# Patient Record
Sex: FEMALE | Race: BLACK OR AFRICAN AMERICAN | NOT HISPANIC OR LATINO | URBAN - METROPOLITAN AREA
[De-identification: names, ages, dates, MRNs, and addresses within clinical notes are randomized per-mention and may not be internally consistent; named-entity substitution may affect disease eponyms.]

---

## 2021-08-25 PROCEDURE — 87186 SC STD MICRODIL/AGAR DIL: CPT | Performed by: FAMILY MEDICINE

## 2021-08-25 PROCEDURE — 87086 URINE CULTURE/COLONY COUNT: CPT | Performed by: FAMILY MEDICINE

## 2021-08-25 PROCEDURE — 87077 CULTURE AEROBIC IDENTIFY: CPT | Performed by: FAMILY MEDICINE

## 2021-08-27 ENCOUNTER — TELEPHONE (OUTPATIENT)
Dept: URGENT CARE | Facility: CLINIC | Age: 36
End: 2021-08-27

## 2021-08-27 NOTE — TELEPHONE ENCOUNTER
----- Message from Promise Woods MD sent at 8/27/2021 12:16 PM EDT -----  Please notify patient that her urine culture is growing E. coli sensitive to the antibiotic she is already taking.  She should continue taking her antibiotic until the course is complete.

## 2021-08-28 ENCOUNTER — TELEPHONE (OUTPATIENT)
Dept: URGENT CARE | Facility: CLINIC | Age: 36
End: 2021-08-28

## 2021-08-29 ENCOUNTER — TELEPHONE (OUTPATIENT)
Dept: URGENT CARE | Facility: CLINIC | Age: 36
End: 2021-08-29

## 2021-12-14 ENCOUNTER — APPOINTMENT (OUTPATIENT)
Dept: GENERAL RADIOLOGY | Facility: HOSPITAL | Age: 36
End: 2021-12-14

## 2021-12-14 ENCOUNTER — HOSPITAL ENCOUNTER (EMERGENCY)
Facility: HOSPITAL | Age: 36
Discharge: HOME | End: 2021-12-14
Attending: EMERGENCY MEDICINE | Admitting: EMERGENCY MEDICINE

## 2021-12-14 VITALS
BODY MASS INDEX: 35.99 KG/M2 | HEIGHT: 67 IN | TEMPERATURE: 98.5 F | HEART RATE: 74 BPM | DIASTOLIC BLOOD PRESSURE: 87 MMHG | OXYGEN SATURATION: 100 % | SYSTOLIC BLOOD PRESSURE: 164 MMHG | WEIGHT: 229.28 LBS | RESPIRATION RATE: 18 BRPM

## 2021-12-14 DIAGNOSIS — J04.0 LARYNGITIS, ACUTE: Primary | ICD-10-CM

## 2021-12-14 LAB
ALBUMIN SERPL-MCNC: 3.9 G/DL (ref 3.5–5.2)
ALBUMIN/GLOB SERPL: 1.3 G/DL
ALP SERPL-CCNC: 108 U/L (ref 39–117)
ALT SERPL W P-5'-P-CCNC: 12 U/L (ref 1–33)
ANION GAP SERPL CALCULATED.3IONS-SCNC: 9.6 MMOL/L (ref 5–15)
AST SERPL-CCNC: 14 U/L (ref 1–32)
BASOPHILS # BLD AUTO: 0.03 10*3/MM3 (ref 0–0.2)
BASOPHILS NFR BLD AUTO: 0.4 % (ref 0–1.5)
BILIRUB SERPL-MCNC: 0.4 MG/DL (ref 0–1.2)
BILIRUB UR QL STRIP: NEGATIVE
BUN SERPL-MCNC: 8 MG/DL (ref 6–20)
BUN/CREAT SERPL: 11.3 (ref 7–25)
CALCIUM SPEC-SCNC: 9.1 MG/DL (ref 8.6–10.5)
CHLORIDE SERPL-SCNC: 102 MMOL/L (ref 98–107)
CLARITY UR: CLEAR
CO2 SERPL-SCNC: 28.4 MMOL/L (ref 22–29)
COLOR UR: YELLOW
CREAT SERPL-MCNC: 0.71 MG/DL (ref 0.57–1)
DEPRECATED RDW RBC AUTO: 51.8 FL (ref 37–54)
EOSINOPHIL # BLD AUTO: 0.17 10*3/MM3 (ref 0–0.4)
EOSINOPHIL NFR BLD AUTO: 2.2 % (ref 0.3–6.2)
ERYTHROCYTE [DISTWIDTH] IN BLOOD BY AUTOMATED COUNT: 13.9 % (ref 12.3–15.4)
GFR SERPL CREATININE-BSD FRML MDRD: 112 ML/MIN/1.73
GLOBULIN UR ELPH-MCNC: 3.1 GM/DL
GLUCOSE SERPL-MCNC: 96 MG/DL (ref 65–99)
GLUCOSE UR STRIP-MCNC: NEGATIVE MG/DL
HCG INTACT+B SERPL-ACNC: 1.18 MIU/ML
HCT VFR BLD AUTO: 39.5 % (ref 34–46.6)
HGB BLD-MCNC: 13.9 G/DL (ref 12–15.9)
HGB UR QL STRIP.AUTO: NEGATIVE
HOLD SPECIMEN: NORMAL
HOLD SPECIMEN: NORMAL
IMM GRANULOCYTES # BLD AUTO: 0.02 10*3/MM3 (ref 0–0.05)
IMM GRANULOCYTES NFR BLD AUTO: 0.3 % (ref 0–0.5)
KETONES UR QL STRIP: NEGATIVE
LEUKOCYTE ESTERASE UR QL STRIP.AUTO: NEGATIVE
LIPASE SERPL-CCNC: 23 U/L (ref 13–60)
LYMPHOCYTES # BLD AUTO: 2.76 10*3/MM3 (ref 0.7–3.1)
LYMPHOCYTES NFR BLD AUTO: 36.2 % (ref 19.6–45.3)
MCH RBC QN AUTO: 35.9 PG (ref 26.6–33)
MCHC RBC AUTO-ENTMCNC: 35.2 G/DL (ref 31.5–35.7)
MCV RBC AUTO: 102.1 FL (ref 79–97)
MONOCYTES # BLD AUTO: 0.57 10*3/MM3 (ref 0.1–0.9)
MONOCYTES NFR BLD AUTO: 7.5 % (ref 5–12)
NEUTROPHILS NFR BLD AUTO: 4.07 10*3/MM3 (ref 1.7–7)
NEUTROPHILS NFR BLD AUTO: 53.4 % (ref 42.7–76)
NITRITE UR QL STRIP: NEGATIVE
NRBC BLD AUTO-RTO: 0 /100 WBC (ref 0–0.2)
PH UR STRIP.AUTO: 8 [PH] (ref 5–8)
PLATELET # BLD AUTO: 358 10*3/MM3 (ref 140–450)
PMV BLD AUTO: 8.9 FL (ref 6–12)
POTASSIUM SERPL-SCNC: 3.4 MMOL/L (ref 3.5–5.2)
PROT SERPL-MCNC: 7 G/DL (ref 6–8.5)
PROT UR QL STRIP: NEGATIVE
RBC # BLD AUTO: 3.87 10*6/MM3 (ref 3.77–5.28)
SODIUM SERPL-SCNC: 140 MMOL/L (ref 136–145)
SP GR UR STRIP: 1.01 (ref 1–1.03)
UROBILINOGEN UR QL STRIP: ABNORMAL
WBC NRBC COR # BLD: 7.62 10*3/MM3 (ref 3.4–10.8)
WHOLE BLOOD HOLD SPECIMEN: NORMAL
WHOLE BLOOD HOLD SPECIMEN: NORMAL

## 2021-12-14 PROCEDURE — 36415 COLL VENOUS BLD VENIPUNCTURE: CPT

## 2021-12-14 PROCEDURE — 94640 AIRWAY INHALATION TREATMENT: CPT

## 2021-12-14 PROCEDURE — 99283 EMERGENCY DEPT VISIT LOW MDM: CPT

## 2021-12-14 PROCEDURE — 80053 COMPREHEN METABOLIC PANEL: CPT

## 2021-12-14 PROCEDURE — 85025 COMPLETE CBC W/AUTO DIFF WBC: CPT

## 2021-12-14 PROCEDURE — 96375 TX/PRO/DX INJ NEW DRUG ADDON: CPT

## 2021-12-14 PROCEDURE — 25010000002 KETOROLAC TROMETHAMINE PER 15 MG: Performed by: PHYSICIAN ASSISTANT

## 2021-12-14 PROCEDURE — 25010000002 ONDANSETRON PER 1 MG: Performed by: PHYSICIAN ASSISTANT

## 2021-12-14 PROCEDURE — 83690 ASSAY OF LIPASE: CPT

## 2021-12-14 PROCEDURE — 94799 UNLISTED PULMONARY SVC/PX: CPT

## 2021-12-14 PROCEDURE — 84702 CHORIONIC GONADOTROPIN TEST: CPT

## 2021-12-14 PROCEDURE — 96374 THER/PROPH/DIAG INJ IV PUSH: CPT

## 2021-12-14 PROCEDURE — 71045 X-RAY EXAM CHEST 1 VIEW: CPT

## 2021-12-14 PROCEDURE — 81003 URINALYSIS AUTO W/O SCOPE: CPT

## 2021-12-14 RX ORDER — ONDANSETRON 2 MG/ML
4 INJECTION INTRAMUSCULAR; INTRAVENOUS ONCE
Status: COMPLETED | OUTPATIENT
Start: 2021-12-14 | End: 2021-12-14

## 2021-12-14 RX ORDER — GUAIFENESIN DEXTROMETHORPHAN HYDROBROMIDE ORAL SOLUTION 10; 100 MG/5ML; MG/5ML
5 SOLUTION ORAL EVERY 12 HOURS
Qty: 100 ML | Refills: 0 | Status: SHIPPED | OUTPATIENT
Start: 2021-12-14

## 2021-12-14 RX ORDER — SODIUM CHLORIDE 0.9 % (FLUSH) 0.9 %
10 SYRINGE (ML) INJECTION AS NEEDED
Status: DISCONTINUED | OUTPATIENT
Start: 2021-12-14 | End: 2021-12-14 | Stop reason: HOSPADM

## 2021-12-14 RX ORDER — IPRATROPIUM BROMIDE AND ALBUTEROL SULFATE 2.5; .5 MG/3ML; MG/3ML
3 SOLUTION RESPIRATORY (INHALATION) ONCE
Status: COMPLETED | OUTPATIENT
Start: 2021-12-14 | End: 2021-12-14

## 2021-12-14 RX ORDER — ONDANSETRON 4 MG/1
4 TABLET, ORALLY DISINTEGRATING ORAL 4 TIMES DAILY PRN
Qty: 12 TABLET | Refills: 0 | Status: SHIPPED | OUTPATIENT
Start: 2021-12-14 | End: 2022-01-18

## 2021-12-14 RX ORDER — ALBUTEROL SULFATE 90 UG/1
2 AEROSOL, METERED RESPIRATORY (INHALATION) EVERY 4 HOURS PRN
Qty: 1 G | Refills: 0 | Status: SHIPPED | OUTPATIENT
Start: 2021-12-14

## 2021-12-14 RX ORDER — PREDNISONE 20 MG/1
TABLET ORAL
Qty: 8 TABLET | Refills: 0 | Status: SHIPPED | OUTPATIENT
Start: 2021-12-14 | End: 2021-12-24

## 2021-12-14 RX ORDER — KETOROLAC TROMETHAMINE 30 MG/ML
30 INJECTION, SOLUTION INTRAMUSCULAR; INTRAVENOUS ONCE
Status: COMPLETED | OUTPATIENT
Start: 2021-12-14 | End: 2021-12-14

## 2021-12-14 RX ADMIN — ONDANSETRON 4 MG: 2 INJECTION INTRAMUSCULAR; INTRAVENOUS at 17:50

## 2021-12-14 RX ADMIN — IPRATROPIUM BROMIDE AND ALBUTEROL SULFATE 3 ML: .5; 3 SOLUTION RESPIRATORY (INHALATION) at 18:43

## 2021-12-14 RX ADMIN — SODIUM CHLORIDE 1000 ML: 9 INJECTION, SOLUTION INTRAVENOUS at 17:49

## 2021-12-14 RX ADMIN — KETOROLAC TROMETHAMINE 30 MG: 30 INJECTION, SOLUTION INTRAMUSCULAR; INTRAVENOUS at 17:50

## 2021-12-15 NOTE — ED PROVIDER NOTES
Subjective   36-year-old female presents to the emergency department with complaints of sore throat, generalized body aches, and fatigue x3 weeks.  Patient states she was seen in urgent care today and had Covid, strep, flu rand which all came back negative.  They sent her here for further evaluation management of symptoms.  Patient denies any fevers or chills, shortness of breath or difficulty breathing, chest pain, abdominal pain, bowel or bladder complaints.  Resting comfortably, no acute distress.  Blood pressure initially elevated, denies previous history of hypertension, will repeat after ministration medications and fluids.      History provided by:  Patient   used: No        Review of Systems   Constitutional: Negative for chills and fever.   HENT: Positive for congestion, sore throat and voice change. Negative for ear pain, sinus pressure, sinus pain and trouble swallowing.    Eyes: Negative for pain.   Respiratory: Positive for cough. Negative for chest tightness and shortness of breath.    Cardiovascular: Negative for chest pain.   Gastrointestinal: Negative for abdominal pain, diarrhea, nausea and vomiting.   Genitourinary: Negative for flank pain and hematuria.   Musculoskeletal: Negative for joint swelling.   Skin: Negative for pallor.   Neurological: Negative for seizures and headaches.   All other systems reviewed and are negative.      Past Medical History:   Diagnosis Date   • Anxiety and depression        No Known Allergies    History reviewed. No pertinent surgical history.    History reviewed. No pertinent family history.    Social History     Socioeconomic History   • Marital status: Single   Tobacco Use   • Smoking status: Current Every Day Smoker     Packs/day: 0.50     Types: Cigarettes   • Smokeless tobacco: Never Used   Vaping Use   • Vaping Use: Never used   Substance and Sexual Activity   • Alcohol use: Yes     Comment: OCC           Objective   Physical Exam  Vitals  and nursing note reviewed.   Constitutional:       General: She is not in acute distress.     Appearance: Normal appearance. She is not toxic-appearing.   HENT:      Head: Normocephalic and atraumatic.      Nose: Congestion and rhinorrhea present.      Mouth/Throat:      Mouth: Mucous membranes are moist.      Pharynx: Uvula midline. Posterior oropharyngeal erythema present. No oropharyngeal exudate or uvula swelling.      Tonsils: No tonsillar exudate or tonsillar abscesses.   Eyes:      General: No scleral icterus.     Conjunctiva/sclera: Conjunctivae normal.      Pupils: Pupils are equal, round, and reactive to light.   Cardiovascular:      Rate and Rhythm: Normal rate and regular rhythm.      Pulses: Normal pulses.      Heart sounds: Normal heart sounds.   Pulmonary:      Effort: Pulmonary effort is normal. No respiratory distress.      Breath sounds: Normal breath sounds.   Abdominal:      General: Abdomen is flat.      Palpations: Abdomen is soft.      Tenderness: There is no abdominal tenderness.   Musculoskeletal:         General: Normal range of motion.      Cervical back: Normal range of motion and neck supple.   Skin:     General: Skin is warm and dry.      Capillary Refill: Capillary refill takes less than 2 seconds.   Neurological:      General: No focal deficit present.      Mental Status: She is alert and oriented to person, place, and time. Mental status is at baseline.         Procedures           ED Course  ED Course as of 12/14/21 1933   Tue Dec 14, 2021   1928 BP: 168/93  FU with PCP regarding elevated BP.  [KF]      ED Course User Index  [KF] Marleen Espinal, MOON                                                 MDM  Number of Diagnoses or Management Options  Diagnosis management comments: The patient is well-appearing and in no respiratory distress.  Patient tested negative for Covid, flu, strep at a prior facility before arrival.  The patient has a normal mental status and is neurologically  intact. The patient appears well and is able to tolerate food for fluid by mouth and there's no significant dehydration. There is no respiratory distress and no signs of systemic toxicity. The history, exam, diagnostic testing and current condition do not demonstrate an infectious process such as meningitis, retropharyngeal abscess, epiglottitis, sepsis or other serious bacterial infection requiring further testing, treatment, consultation, or admission at this time. The patient has no additional oxygen requirement. The patient has a chest x-ray revealed no acute abnormalities.  Based on physical exam and history it was determined the patient is most likely experiencing laryngitis which can be relieved with warm humidified air, voice rest, anti-inflammatories and other medications for symptomatic relief. The patient has no respiratory distress, or hypoxia.  The vital signs have been stable. The patient's condition is stable and appropriate for discharge. The possible party was advised to return for worsening fever, respiratory distress, intractable vomiting, weakness, shortness of breath, chest pain, or altered mental status. The responsible party will pursue further outpatient evaluation with the primary care physician. The possible party has expressed a clear and thorough understanding and agreed to follow-up as instructed.       Amount and/or Complexity of Data Reviewed  Clinical lab tests: reviewed and ordered  Tests in the radiology section of CPT®: reviewed and ordered    Risk of Complications, Morbidity, and/or Mortality  Presenting problems: low  Diagnostic procedures: low  Management options: low    Patient Progress  Patient progress: stable      Final diagnoses:   Laryngitis, acute       ED Disposition  ED Disposition     ED Disposition Condition Comment    Discharge Stable           No follow-up provider specified.       Medication List      New Prescriptions    albuterol sulfate  (90 Base) MCG/ACT  inhaler  Commonly known as: PROVENTIL HFA;VENTOLIN HFA;PROAIR HFA  Inhale 2 puffs Every 4 (Four) Hours As Needed for Wheezing.     dextromethorphan-guaifenesin  MG/5ML liquid  Commonly known as: ROBITUSSIN-DM  Take 5 mL by mouth Every 12 (Twelve) Hours.     ondansetron ODT 4 MG disintegrating tablet  Commonly known as: ZOFRAN-ODT  Place 1 tablet under the tongue 4 (Four) Times a Day As Needed for Nausea or Vomiting.     predniSONE 20 MG tablet  Commonly known as: DELTASONE  Take 1 tablet by mouth Daily for 5 days, THEN 0.5 tablets Daily for 5 days.  Start taking on: December 14, 2021           Where to Get Your Medications      These medications were sent to [facility] - [place], [place] - [address] - [phone]  - [phone] FX  [address] [facility] [address]    Phone: [phone]   · albuterol sulfate  (90 Base) MCG/ACT inhaler  · dextromethorphan-guaifenesin  MG/5ML liquid  · ondansetron ODT 4 MG disintegrating tablet  · predniSONE 20 MG tablet          Marleen Espinal, PAMichaelC  12/14/21 1933

## 2021-12-15 NOTE — DISCHARGE INSTRUCTIONS
Patient should use albuterol inhaler every 4 hours as needed for shortness of breath and wheezing.    Patient should use Robitussin-DM every 12 hours as needed for cough, congestion, and sore throat.    Patient take Zofran up to four times daily as needed for nausea and vomiting.    Patient should take prednisone 20 mg 5 days, followed by 10 mg 5 days.    Alternate Tylenol and Motrin for symptomatic relief of pain.    Most important treatment is voice rest, humidified air, warm fluid intake.  Follow-up with primary care provider regarding elevated blood pressure found here in the emergency department.  Diagnosis for central hypertension involves two separate readings on two separate occasions, first-line treatment includes calcium channel blockers.

## 2022-01-18 ENCOUNTER — APPOINTMENT (OUTPATIENT)
Dept: ULTRASOUND IMAGING | Facility: HOSPITAL | Age: 37
End: 2022-01-18

## 2022-01-18 ENCOUNTER — HOSPITAL ENCOUNTER (EMERGENCY)
Facility: HOSPITAL | Age: 37
Discharge: HOME | End: 2022-01-18
Attending: EMERGENCY MEDICINE | Admitting: EMERGENCY MEDICINE

## 2022-01-18 VITALS
SYSTOLIC BLOOD PRESSURE: 116 MMHG | TEMPERATURE: 98.2 F | HEIGHT: 67 IN | DIASTOLIC BLOOD PRESSURE: 73 MMHG | HEART RATE: 81 BPM | BODY MASS INDEX: 34.5 KG/M2 | RESPIRATION RATE: 18 BRPM | WEIGHT: 219.8 LBS | OXYGEN SATURATION: 100 %

## 2022-01-18 DIAGNOSIS — J06.9 VIRAL UPPER RESPIRATORY TRACT INFECTION: ICD-10-CM

## 2022-01-18 DIAGNOSIS — N93.9 ABNORMAL VAGINAL BLEEDING: Primary | ICD-10-CM

## 2022-01-18 DIAGNOSIS — Z20.822 COVID-19 VIRUS TEST RESULT UNKNOWN: ICD-10-CM

## 2022-01-18 LAB
ANION GAP SERPL CALCULATED.3IONS-SCNC: 7.9 MMOL/L (ref 5–15)
BACTERIA UR QL AUTO: ABNORMAL /HPF
BASOPHILS # BLD AUTO: 0.02 10*3/MM3 (ref 0–0.2)
BASOPHILS NFR BLD AUTO: 0.4 % (ref 0–1.5)
BILIRUB UR QL STRIP: NEGATIVE
BUN SERPL-MCNC: 7 MG/DL (ref 6–20)
BUN/CREAT SERPL: 10.9 (ref 7–25)
CALCIUM SPEC-SCNC: 9 MG/DL (ref 8.6–10.5)
CHLORIDE SERPL-SCNC: 108 MMOL/L (ref 98–107)
CLARITY UR: CLEAR
CO2 SERPL-SCNC: 24.1 MMOL/L (ref 22–29)
COLOR UR: YELLOW
CREAT SERPL-MCNC: 0.64 MG/DL (ref 0.57–1)
DEPRECATED RDW RBC AUTO: 50.1 FL (ref 37–54)
EOSINOPHIL # BLD AUTO: 0.07 10*3/MM3 (ref 0–0.4)
EOSINOPHIL NFR BLD AUTO: 1.4 % (ref 0.3–6.2)
ERYTHROCYTE [DISTWIDTH] IN BLOOD BY AUTOMATED COUNT: 13.5 % (ref 12.3–15.4)
FLUAV AG NPH QL: NEGATIVE
FLUBV AG NPH QL IA: NEGATIVE
GFR SERPL CREATININE-BSD FRML MDRD: 127 ML/MIN/1.73
GLUCOSE SERPL-MCNC: 95 MG/DL (ref 65–99)
GLUCOSE UR STRIP-MCNC: NEGATIVE MG/DL
HCG SERPL QL: NEGATIVE
HCT VFR BLD AUTO: 36.9 % (ref 34–46.6)
HGB BLD-MCNC: 12.7 G/DL (ref 12–15.9)
HGB UR QL STRIP.AUTO: ABNORMAL
HOLD SPECIMEN: NORMAL
HOLD SPECIMEN: NORMAL
HYALINE CASTS UR QL AUTO: ABNORMAL /LPF
IMM GRANULOCYTES # BLD AUTO: 0.01 10*3/MM3 (ref 0–0.05)
IMM GRANULOCYTES NFR BLD AUTO: 0.2 % (ref 0–0.5)
KETONES UR QL STRIP: NEGATIVE
LEUKOCYTE ESTERASE UR QL STRIP.AUTO: ABNORMAL
LIPASE SERPL-CCNC: 21 U/L (ref 13–60)
LYMPHOCYTES # BLD AUTO: 1.71 10*3/MM3 (ref 0.7–3.1)
LYMPHOCYTES NFR BLD AUTO: 33.3 % (ref 19.6–45.3)
MCH RBC QN AUTO: 34.8 PG (ref 26.6–33)
MCHC RBC AUTO-ENTMCNC: 34.4 G/DL (ref 31.5–35.7)
MCV RBC AUTO: 101.1 FL (ref 79–97)
MONOCYTES # BLD AUTO: 0.44 10*3/MM3 (ref 0.1–0.9)
MONOCYTES NFR BLD AUTO: 8.6 % (ref 5–12)
NEUTROPHILS NFR BLD AUTO: 2.88 10*3/MM3 (ref 1.7–7)
NEUTROPHILS NFR BLD AUTO: 56.1 % (ref 42.7–76)
NITRITE UR QL STRIP: NEGATIVE
NRBC BLD AUTO-RTO: 0 /100 WBC (ref 0–0.2)
PH UR STRIP.AUTO: 6 [PH] (ref 5–8)
PLATELET # BLD AUTO: 327 10*3/MM3 (ref 140–450)
PMV BLD AUTO: 11 FL (ref 6–12)
POTASSIUM SERPL-SCNC: 3.5 MMOL/L (ref 3.5–5.2)
PROT UR QL STRIP: NEGATIVE
RBC # BLD AUTO: 3.65 10*6/MM3 (ref 3.77–5.28)
RBC # UR STRIP: ABNORMAL /HPF
REF LAB TEST METHOD: ABNORMAL
S PYO AG THROAT QL: NEGATIVE
SODIUM SERPL-SCNC: 140 MMOL/L (ref 136–145)
SP GR UR STRIP: 1.01 (ref 1–1.03)
SQUAMOUS #/AREA URNS HPF: ABNORMAL /HPF
UROBILINOGEN UR QL STRIP: ABNORMAL
WBC # UR STRIP: ABNORMAL /HPF
WBC NRBC COR # BLD: 5.13 10*3/MM3 (ref 3.4–10.8)
WHOLE BLOOD HOLD SPECIMEN: NORMAL
WHOLE BLOOD HOLD SPECIMEN: NORMAL

## 2022-01-18 PROCEDURE — 25010000002 KETOROLAC TROMETHAMINE PER 15 MG: Performed by: NURSE PRACTITIONER

## 2022-01-18 PROCEDURE — 87081 CULTURE SCREEN ONLY: CPT | Performed by: NURSE PRACTITIONER

## 2022-01-18 PROCEDURE — 96375 TX/PRO/DX INJ NEW DRUG ADDON: CPT

## 2022-01-18 PROCEDURE — 84703 CHORIONIC GONADOTROPIN ASSAY: CPT | Performed by: NURSE PRACTITIONER

## 2022-01-18 PROCEDURE — 87880 STREP A ASSAY W/OPTIC: CPT | Performed by: NURSE PRACTITIONER

## 2022-01-18 PROCEDURE — 25010000002 ONDANSETRON PER 1 MG: Performed by: NURSE PRACTITIONER

## 2022-01-18 PROCEDURE — U0004 COV-19 TEST NON-CDC HGH THRU: HCPCS | Performed by: NURSE PRACTITIONER

## 2022-01-18 PROCEDURE — 76830 TRANSVAGINAL US NON-OB: CPT

## 2022-01-18 PROCEDURE — 80048 BASIC METABOLIC PNL TOTAL CA: CPT | Performed by: NURSE PRACTITIONER

## 2022-01-18 PROCEDURE — 99284 EMERGENCY DEPT VISIT MOD MDM: CPT

## 2022-01-18 PROCEDURE — 87804 INFLUENZA ASSAY W/OPTIC: CPT | Performed by: NURSE PRACTITIONER

## 2022-01-18 PROCEDURE — 83690 ASSAY OF LIPASE: CPT | Performed by: NURSE PRACTITIONER

## 2022-01-18 PROCEDURE — 85025 COMPLETE CBC W/AUTO DIFF WBC: CPT

## 2022-01-18 PROCEDURE — 81001 URINALYSIS AUTO W/SCOPE: CPT | Performed by: NURSE PRACTITIONER

## 2022-01-18 PROCEDURE — 36415 COLL VENOUS BLD VENIPUNCTURE: CPT

## 2022-01-18 PROCEDURE — 96374 THER/PROPH/DIAG INJ IV PUSH: CPT

## 2022-01-18 RX ORDER — KETOROLAC TROMETHAMINE 30 MG/ML
30 INJECTION, SOLUTION INTRAMUSCULAR; INTRAVENOUS ONCE
Status: COMPLETED | OUTPATIENT
Start: 2022-01-18 | End: 2022-01-18

## 2022-01-18 RX ORDER — SODIUM CHLORIDE 0.9 % (FLUSH) 0.9 %
10 SYRINGE (ML) INJECTION AS NEEDED
Status: DISCONTINUED | OUTPATIENT
Start: 2022-01-18 | End: 2022-01-19 | Stop reason: HOSPADM

## 2022-01-18 RX ORDER — KETOROLAC TROMETHAMINE 10 MG/1
10 TABLET, FILM COATED ORAL EVERY 6 HOURS PRN
Qty: 15 TABLET | Refills: 0 | Status: SHIPPED | OUTPATIENT
Start: 2022-01-18

## 2022-01-18 RX ORDER — ONDANSETRON 4 MG/1
4 TABLET, ORALLY DISINTEGRATING ORAL 4 TIMES DAILY PRN
Qty: 15 TABLET | Refills: 0 | Status: SHIPPED | OUTPATIENT
Start: 2022-01-18

## 2022-01-18 RX ORDER — ONDANSETRON 2 MG/ML
4 INJECTION INTRAMUSCULAR; INTRAVENOUS ONCE
Status: COMPLETED | OUTPATIENT
Start: 2022-01-18 | End: 2022-01-18

## 2022-01-18 RX ADMIN — KETOROLAC TROMETHAMINE 30 MG: 30 INJECTION, SOLUTION INTRAMUSCULAR; INTRAVENOUS at 21:53

## 2022-01-18 RX ADMIN — ONDANSETRON 4 MG: 2 INJECTION INTRAMUSCULAR; INTRAVENOUS at 21:53

## 2022-01-18 RX ADMIN — SODIUM CHLORIDE 500 ML: 9 INJECTION, SOLUTION INTRAVENOUS at 21:53

## 2022-01-19 LAB — SARS-COV-2 RNA PNL SPEC NAA+PROBE: NOT DETECTED

## 2022-01-19 NOTE — DISCHARGE INSTRUCTIONS
Rest, drink plenty of fluids.  Take your meds as prescribed.  You may also take over-the-counter acetaminophen as needed for aches pains and fever.  Call and follow-up with your primary healthcare provider in 2 to 3 days for further evaluation and treatment and to discuss a referral over to a GYN if your symptoms persist or become more worrisome.  You were tested for COVID-19 in the emergency department.  You will need to check your electronic health records within 6 to 12 hours for those test results.  According to the CDC guidelines you will need to continue to quarantine until such time you have a negative test result or your symptoms have resolved.  Return to the emergency department for any acutely developing respiratory distress, any persistent vomiting, or any new or worse concerns.

## 2022-01-19 NOTE — ED PROVIDER NOTES
Subjective   The patient presents to the emergency department and states that her normal menstrual cycle ended at the end of December.  She states that on January 7 she started having a brown discharge and since then has been having vaginal bleeding.  She states that today she passed a large clot and that this concerned her.  She is also complained of lower pelvic pain that she states started on the seventh also.  She states that it goes through to her back.  She states that she has had nausea and vomiting since then and also upper respiratory symptoms of cough and congestion.  She states that she has not had any fevers.  She denies having her COVID-vaccine but states that she does not have COVID because she does not go anywhere.  Patient denies any significant urinary symptoms.  She reports that she is had no diarrhea.  She denies any blood or mucus in her stools.  She does have tenderness in her lower pelvic region with palpation with no rebound or guarding.          Review of Systems   Constitutional: Positive for fatigue. Negative for chills and fever.   HENT: Positive for congestion, rhinorrhea and sore throat. Negative for ear pain, trouble swallowing and voice change.    Eyes: Negative for pain.   Respiratory: Positive for cough. Negative for chest tightness, shortness of breath and wheezing.    Cardiovascular: Negative for chest pain and leg swelling.   Gastrointestinal: Positive for nausea and vomiting. Negative for abdominal pain, constipation and diarrhea.   Genitourinary: Positive for pelvic pain and vaginal discharge. Negative for dysuria, flank pain, frequency, hematuria, urgency and vaginal bleeding.   Musculoskeletal: Positive for myalgias. Negative for back pain, joint swelling, neck pain and neck stiffness.   Skin: Negative for pallor and rash.   Neurological: Negative for seizures and headaches.   All other systems reviewed and are negative.      Past Medical History:   Diagnosis Date   • Anxiety  and depression        No Known Allergies    History reviewed. No pertinent surgical history.    History reviewed. No pertinent family history.    Social History     Socioeconomic History   • Marital status: Single   Tobacco Use   • Smoking status: Current Every Day Smoker     Packs/day: 0.50     Types: Cigarettes   • Smokeless tobacco: Never Used   Vaping Use   • Vaping Use: Never used   Substance and Sexual Activity   • Alcohol use: Yes     Comment: OCC           Objective   Physical Exam  Vitals and nursing note reviewed.   Constitutional:       General: She is not in acute distress.     Appearance: Normal appearance. She is not toxic-appearing.   HENT:      Head: Normocephalic and atraumatic.   Eyes:      General: No scleral icterus.  Cardiovascular:      Rate and Rhythm: Normal rate and regular rhythm.      Pulses: Normal pulses.   Pulmonary:      Effort: Pulmonary effort is normal. No respiratory distress.      Breath sounds: Normal breath sounds. No wheezing.   Abdominal:      General: Abdomen is flat.      Palpations: Abdomen is soft.      Tenderness: There is abdominal tenderness. There is no guarding or rebound.   Musculoskeletal:         General: No swelling or tenderness. Normal range of motion.      Cervical back: Normal range of motion and neck supple. No rigidity or tenderness.   Lymphadenopathy:      Cervical: No cervical adenopathy.   Skin:     General: Skin is warm and dry.      Capillary Refill: Capillary refill takes less than 2 seconds.      Findings: No rash.   Neurological:      General: No focal deficit present.      Mental Status: She is alert and oriented to person, place, and time. Mental status is at baseline.   Psychiatric:         Mood and Affect: Mood normal.         Behavior: Behavior normal.         Procedures           ED Course                                                 MDM  Number of Diagnoses or Management Options  Abnormal vaginal bleeding: minor  COVID-19 virus test result  unknown: minor  Viral upper respiratory tract infection: minor     Amount and/or Complexity of Data Reviewed  Clinical lab tests: reviewed  Tests in the radiology section of CPT®: reviewed  Decide to obtain previous medical records or to obtain history from someone other than the patient: yes    Risk of Complications, Morbidity, and/or Mortality  Presenting problems: minimal  Diagnostic procedures: minimal  Management options: minimal    Patient Progress  Patient progress: stable      Final diagnoses:   Abnormal vaginal bleeding   COVID-19 virus test result unknown   Viral upper respiratory tract infection       ED Disposition  ED Disposition     ED Disposition Condition Comment    Discharge Stable           Sonny Plasencia MD  [address] [place] [address]  [phone]    Call   FOR FOLLOW UP         Medication List      New Prescriptions    ketorolac 10 MG tablet  Commonly known as: TORADOL  Take 1 tablet by mouth Every 6 (Six) Hours As Needed for Moderate Pain .     ondansetron ODT 4 MG disintegrating tablet  Commonly known as: ZOFRAN-ODT  Place 1 tablet on the tongue 4 (Four) Times a Day As Needed for Nausea or Vomiting.           Where to Get Your Medications      These medications were sent to [facility] - [place], [place] - [address] - [phone]  - [phone] FX  [address] [facility] [address]    Phone: [phone]   · ketorolac 10 MG tablet  · ondansetron ODT 4 MG disintegrating tablet          Rachel Hudson, APRN  01/18/22 [id number]

## 2022-01-20 LAB — BACTERIA SPEC AEROBE CULT: NORMAL

## 2023-01-13 ENCOUNTER — HOSPITAL ENCOUNTER (EMERGENCY)
Facility: HOSPITAL | Age: 38
Discharge: HOME | End: 2023-01-13
Attending: EMERGENCY MEDICINE | Admitting: EMERGENCY MEDICINE
Payer: MEDICAID

## 2023-01-13 ENCOUNTER — APPOINTMENT (OUTPATIENT)
Dept: GENERAL RADIOLOGY | Facility: HOSPITAL | Age: 38
End: 2023-01-13
Payer: MEDICAID

## 2023-01-13 VITALS
OXYGEN SATURATION: 100 % | TEMPERATURE: 98.4 F | RESPIRATION RATE: 20 BRPM | SYSTOLIC BLOOD PRESSURE: 157 MMHG | HEART RATE: 92 BPM | DIASTOLIC BLOOD PRESSURE: 102 MMHG | WEIGHT: 216.05 LBS | BODY MASS INDEX: 32.74 KG/M2 | HEIGHT: 68 IN

## 2023-01-13 DIAGNOSIS — J18.9 PNEUMONIA OF LEFT LUNG DUE TO INFECTIOUS ORGANISM, UNSPECIFIED PART OF LUNG: Primary | ICD-10-CM

## 2023-01-13 DIAGNOSIS — R91.1 PULMONARY NODULE: ICD-10-CM

## 2023-01-13 LAB
FLUAV AG NPH QL: NEGATIVE
FLUBV AG NPH QL IA: NEGATIVE
S PYO AG THROAT QL: NEGATIVE
SARS-COV-2 RNA PNL SPEC NAA+PROBE: NOT DETECTED

## 2023-01-13 PROCEDURE — 96374 THER/PROPH/DIAG INJ IV PUSH: CPT

## 2023-01-13 PROCEDURE — 87081 CULTURE SCREEN ONLY: CPT | Performed by: EMERGENCY MEDICINE

## 2023-01-13 PROCEDURE — U0004 COV-19 TEST NON-CDC HGH THRU: HCPCS | Performed by: EMERGENCY MEDICINE

## 2023-01-13 PROCEDURE — 71046 X-RAY EXAM CHEST 2 VIEWS: CPT

## 2023-01-13 PROCEDURE — 25010000002 KETOROLAC TROMETHAMINE PER 15 MG: Performed by: EMERGENCY MEDICINE

## 2023-01-13 PROCEDURE — 99283 EMERGENCY DEPT VISIT LOW MDM: CPT

## 2023-01-13 PROCEDURE — 87880 STREP A ASSAY W/OPTIC: CPT | Performed by: EMERGENCY MEDICINE

## 2023-01-13 PROCEDURE — 87804 INFLUENZA ASSAY W/OPTIC: CPT | Performed by: EMERGENCY MEDICINE

## 2023-01-13 RX ORDER — DOXYCYCLINE 100 MG/1
100 CAPSULE ORAL 2 TIMES DAILY
Qty: 14 CAPSULE | Refills: 0 | Status: SHIPPED | OUTPATIENT
Start: 2023-01-13 | End: 2023-01-20

## 2023-01-13 RX ORDER — KETOROLAC TROMETHAMINE 30 MG/ML
30 INJECTION, SOLUTION INTRAMUSCULAR; INTRAVENOUS ONCE
Status: DISCONTINUED | OUTPATIENT
Start: 2023-01-13 | End: 2023-01-13

## 2023-01-13 RX ORDER — KETOROLAC TROMETHAMINE 30 MG/ML
30 INJECTION, SOLUTION INTRAMUSCULAR; INTRAVENOUS ONCE
Status: COMPLETED | OUTPATIENT
Start: 2023-01-13 | End: 2023-01-13

## 2023-01-13 RX ADMIN — KETOROLAC TROMETHAMINE 30 MG: 30 INJECTION, SOLUTION INTRAMUSCULAR; INTRAVENOUS at 09:31

## 2023-01-13 NOTE — Clinical Note
AMRIT FERNANDEZ  [facility]  [address]  [place] [address]  Phone: [phone]    Terri Fernandez was seen and treated in our emergency department on 1/13/2023.  She may return to work on 01/16/2023.         Thank you for choosing [facility].    Jayden Salas PA-C

## 2023-01-13 NOTE — DISCHARGE INSTRUCTIONS
Please take the full course of antibiotics as directed.  It is very important that you schedule an appointment with your primary care provider to have a CT scan of your chest completed to evaluate the pulmonary nodule that was found on your chest x-ray in the emergency department today

## 2023-01-13 NOTE — ED PROVIDER NOTES
Time: 8:15 AM EST  Date of encounter:  1/13/2023  Independent Historian/Clinical History and Information was obtained by:   Patient  Chief Complaint: Cough, body aches    History is limited by: N/A    History of Present Illness:  Patient is a 37 y.o. year old female who presents to the emergency department for evaluation of cough and body aches.  Patient presents to the emergency department today with complaints of cough and body aches that began on 1-.  Patient does state on Monday after work she did visit a friend who was sick.  Patient states she is having subjective fevers at home with chills.  Patient does state her cough is productive of green and clear sputum.  Patient does states she is a daily smoker.  Patient admits to throat pain, ear pain, nausea, vomiting, diarrhea, and chest tightness.      History provided by:  Patient   used: No        Patient Care Team  Primary Care Provider: Provider, No Known    Past Medical History:     No Known Allergies  Past Medical History:   Diagnosis Date   • Anxiety and depression      History reviewed. No pertinent surgical history.  History reviewed. No pertinent family history.    Home Medications:  Prior to Admission medications    Medication Sig Start Date End Date Taking? Authorizing Provider   albuterol sulfate  (90 Base) MCG/ACT inhaler Inhale 2 puffs Every 4 (Four) Hours As Needed for Wheezing. 12/14/21   Marleen Espinal PA-C   dextromethorphan-guaifenesin (ROBITUSSIN-DM)  MG/5ML liquid Take 5 mL by mouth Every 12 (Twelve) Hours. 12/14/21   Marleen Espinal PA-C   ketorolac (TORADOL) 10 MG tablet Take 1 tablet by mouth Every 6 (Six) Hours As Needed for Moderate Pain . 1/18/22   Rachel Hudson APRN   methylPREDNISolone (MEDROL) 4 MG dose pack Take as directed on package instructions. 11/9/22   Taqui, Humera, MD   ondansetron ODT (ZOFRAN-ODT) 4 MG disintegrating tablet Place 1 tablet on the tongue 4 (Four) Times a Day As  "Needed for Nausea or Vomiting. 1/18/22   Rachel Hudson APRN        Social History:   Social History     Tobacco Use   • Smoking status: Every Day     Packs/day: 0.50     Types: Cigarettes   • Smokeless tobacco: Never   Vaping Use   • Vaping Use: Never used   Substance Use Topics   • Alcohol use: Yes     Comment: OCC   • Drug use: Never         Review of Systems:  Review of Systems   Constitutional: Positive for chills and fever (Subjective).   HENT: Positive for ear pain and sore throat.    Eyes: Negative for pain.   Respiratory: Positive for cough and chest tightness. Negative for shortness of breath.    Cardiovascular: Negative for chest pain.   Gastrointestinal: Positive for diarrhea, nausea and vomiting. Negative for abdominal pain.   Genitourinary: Negative for dysuria.   Musculoskeletal: Negative for arthralgias.   Skin: Negative for rash.   Neurological: Positive for headaches.        Physical Exam:  BP (!) 157/102 (Patient Position: Sitting)   Pulse 92   Temp 98.4 °F (36.9 °C) (Oral)   Resp 20   Ht 172.7 cm (68\")   Wt 98 kg (216 lb 0.8 oz)   LMP 12/26/2022 (Approximate)   SpO2 100%   BMI 32.85 kg/m²     Physical Exam  Vitals and nursing note reviewed.   Constitutional:       General: She is not in acute distress.     Appearance: Normal appearance. She is normal weight. She is ill-appearing. She is not toxic-appearing or diaphoretic.   HENT:      Head: Normocephalic and atraumatic.      Nose: Nose normal.   Eyes:      Extraocular Movements: Extraocular movements intact.      Conjunctiva/sclera: Conjunctivae normal.      Pupils: Pupils are equal, round, and reactive to light.   Cardiovascular:      Rate and Rhythm: Normal rate and regular rhythm.      Heart sounds: Normal heart sounds.   Pulmonary:      Effort: Pulmonary effort is normal. No respiratory distress.      Breath sounds: Normal breath sounds. No stridor. No wheezing, rhonchi or rales.   Abdominal:      General: Abdomen is flat. Bowel " sounds are normal. There is no distension.      Palpations: Abdomen is soft. There is no mass.      Tenderness: There is no abdominal tenderness. There is no guarding.      Hernia: No hernia is present.   Musculoskeletal:         General: Normal range of motion.      Cervical back: Normal range of motion and neck supple.   Skin:     General: Skin is warm and dry.   Neurological:      General: No focal deficit present.      Mental Status: She is alert and oriented to person, place, and time.   Psychiatric:         Mood and Affect: Mood normal.         Behavior: Behavior normal.         Thought Content: Thought content normal.         Judgment: Judgment normal.                  Procedures:  Procedures      Medical Decision Making:      Comorbidities that affect care:    Smoking    External Notes reviewed:    Previous Clinic Note and Previous Radiological Studies      The following orders were placed and all results were independently analyzed by me:  Orders Placed This Encounter   Procedures   • Rapid Strep A Screen - Swab, Throat   • Influenza Antigen, Rapid - Swab, Nasopharynx   • COVID-19,APTIMA PANTHER(ISH),[facility], NP/OP SWAB IN UTM/VTM/SALINE TRANSPORT MEDIA,24 HR TAT - Swab, Nasopharynx   • Beta Strep Culture, Throat - Swab, Throat   • XR Chest 2 View       Medications Given in the Emergency Department:  Medications   ketorolac (TORADOL) injection 30 mg (30 mg Intravenous Given 1/13/23 0931)        ED Course:    ED Course as of 01/13/23 0957   Fri Jan 13, 2023   0940 I discussed this patient with Dr. Delgado due to pulmonary nodule found on chest x-ray.  He stated to have the patient follow-up outpatient for CT of the chest.  We will also begin the patient on antibiotics due to the area being potential pneumonia with patient symptoms of cough and chills. [MD]      ED Course User Index  [MD] Jayden Salas PA-C       Labs:    Lab Results (last 24 hours)     Procedure Component Value Units Date/Time     Rapid Strep A Screen - Swab, Throat [[id number]]  (Normal) Collected: 01/13/23 0843    Specimen: Swab from Throat Updated: 01/13/23 0900     Strep A Ag Negative    Influenza Antigen, Rapid - Swab, Nasopharynx [[id number]]  (Normal) Collected: 01/13/23 0843    Specimen: Swab from Nasopharynx Updated: 01/13/23 0913     Influenza A Ag, EIA Negative     Influenza B Ag, EIA Negative    COVID-19,APTIMA PANTHER(ISH),[facility], NP/OP SWAB IN UTM/VTM/SALINE TRANSPORT MEDIA,24 HR TAT - Swab, Nasopharynx [[id number]] Collected: 01/13/23 0843    Specimen: Swab from Nasopharynx Updated: 01/13/23 0846    Beta Strep Culture, Throat - Swab, Throat [[id number]] Collected: 01/13/23 0843    Specimen: Swab from Throat Updated: 01/13/23 0900           Imaging:    XR Chest 2 View    Result Date: 1/13/2023  PROCEDURE: XR CHEST 2 VW  COMPARISON: [facility], CR, CHEST PA/AP & LAT 2V, 6/24/2013, 14:04.  [facility], CR, XR CHEST 1 VW, 12/14/2021, 18:00.  [facility], CR, CHEST AP/PA 1 VIEW, 9/08/2015, 1:29.  INDICATIONS: Productive cough  FINDINGS:   The lungs are well-expanded. The heart and pulmonary vasculature are within normal limits. No pleural effusions are identified. There is a 1.3 cm density in the left midlung field.  IMPRESSION:  1.3 cm density in the left midlung field.  Recommend a CT scan of the chest to evaluate for a pulmonary nodule.  AREHTA JENSEN MD       Electronically Signed and Approved By: ARETHA JENSEN MD on 1/13/2023 at 9:04                 Differential Diagnosis and Discussion:    Cough: Differential diagnosis includes but is not limited to pneumonia, acute bronchitis, upper respiratory infection, ACE inhibitor use, allergic reaction, epiglottitis, seasonal allergies, chemical irritants, exercise-induced asthma, viral syndrome.    All labs were reviewed and analyzed by me.  All X-rays were independently reviewed by me.    MDM   Consultants/Shared Management Plan:    I  have discussed the case with Dr. Delgado who states that the patient can be safely discharged with close follow up.    Social Determinants of Health:    Patient is independent, reliable, and has access to care.       Disposition and Care Coordination:    Discharged: The patient is suitable and stable for discharge with no need for consideration of observation or admission.    I have explained the patient´s condition, diagnoses and treatment plan based on the information available to me at this time. I have answered questions and addressed any concerns. The patient has a good  understanding of the patient´s diagnosis, condition, and treatment plan as can be expected at this point. The vital signs have been stable. The patient´s condition is stable and appropriate for discharge from the emergency department.      The patient will pursue further outpatient evaluation with the primary care physician or other designated or consulting physician as outlined in the discharge instructions. They are agreeable to this plan of care and follow-up instructions have been explained in detail. The patient has received these instructions in written format and have expressed an understanding of the discharge instructions. The patient is aware that any significant change in condition or worsening of symptoms should prompt an immediate return to this or the closest emergency department or call to 911.  I have explained discharge medications and the need for follow up with the patient/caretakers. This was also printed in the discharge instructions. Patient was discharged with the following medications and follow up:      Medication List      New Prescriptions    doxycycline 100 MG capsule  Commonly known as: MONODOX  Take 1 capsule by mouth 2 (Two) Times a Day for 7 days.           Where to Get Your Medications      These medications were sent to [facility] - [place], [place] - [address] - [phone] [facility]  [phone] FX  [address] [place] [address]    Phone: [phone]   · doxycycline 100 MG capsule      Provider, No Known  [facility]  [place] [address]    Schedule an appointment as soon as possible for a visit          Final diagnoses:   Pneumonia of left lung due to infectious organism, unspecified part of lung   Pulmonary nodule        ED Disposition     ED Disposition   Discharge    Condition   Stable    Comment   --             This medical record created using voice recognition software.           Jayden Salas PA-C  01/13/23 0957

## 2023-01-15 LAB — BACTERIA SPEC AEROBE CULT: NORMAL

## 2023-05-28 PROBLEM — N34.2 URETHRITIS: Status: ACTIVE | Noted: 2023-05-28

## 2023-05-28 PROCEDURE — 87660 TRICHOMONAS VAGIN DIR PROBE: CPT | Performed by: STUDENT IN AN ORGANIZED HEALTH CARE EDUCATION/TRAINING PROGRAM

## 2023-05-28 PROCEDURE — 87510 GARDNER VAG DNA DIR PROBE: CPT | Performed by: STUDENT IN AN ORGANIZED HEALTH CARE EDUCATION/TRAINING PROGRAM

## 2023-05-28 PROCEDURE — 87491 CHLMYD TRACH DNA AMP PROBE: CPT | Performed by: STUDENT IN AN ORGANIZED HEALTH CARE EDUCATION/TRAINING PROGRAM

## 2023-05-28 PROCEDURE — 87591 N.GONORRHOEAE DNA AMP PROB: CPT | Performed by: STUDENT IN AN ORGANIZED HEALTH CARE EDUCATION/TRAINING PROGRAM

## 2023-05-28 PROCEDURE — 87480 CANDIDA DNA DIR PROBE: CPT | Performed by: STUDENT IN AN ORGANIZED HEALTH CARE EDUCATION/TRAINING PROGRAM

## 2023-05-29 ENCOUNTER — TELEPHONE (OUTPATIENT)
Dept: URGENT CARE | Facility: CLINIC | Age: 38
End: 2023-05-29

## 2023-05-29 DIAGNOSIS — N76.0 BACTERIAL VAGINOSIS: Primary | ICD-10-CM

## 2023-05-29 DIAGNOSIS — B96.89 BACTERIAL VAGINOSIS: Primary | ICD-10-CM

## 2023-05-29 DIAGNOSIS — A59.9 TRICHOMONAS INFECTION: ICD-10-CM

## 2023-05-29 RX ORDER — SACCHAROMYCES BOULARDII 250 MG
250 CAPSULE ORAL 2 TIMES DAILY
Qty: 28 CAPSULE | Refills: 0 | Status: SHIPPED | OUTPATIENT
Start: 2023-05-29 | End: 2023-06-12

## 2023-05-29 RX ORDER — METRONIDAZOLE 500 MG/1
500 TABLET ORAL 2 TIMES DAILY
Qty: 14 TABLET | Refills: 0 | Status: SHIPPED | OUTPATIENT
Start: 2023-05-29 | End: 2023-06-05

## 2023-05-29 NOTE — TELEPHONE ENCOUNTER
Spoke to patient who verified date of birth for patient safety.  Notified patient of negative GC and Chlamydia results.  Patient prophylactically treated due to concern for STIs and symptoms in clinic on the date of service with IM Rocephin and a prescription for azithromycin was sent to her pharmacy.  Patient reports she has not picked up or taken the azithromycin yet.  I advised patient due to negative GC and chlamydia results she does not need to take her azithromycin and patient verbalized understanding.  I notified patient of positive bacterial vaginosis and trichomonas results and discussed need for treatment with oral Flagyl.  Advised patient to abstain from any sexual activity for the next 2 weeks.  Advised patient that taking multiple antibiotics could cause some GI upset and I will send a prescription for probiotics in with her Flagyl.  Patient advised to continue taking her Macrobid as she was positive for a UTI on the date of service.  Patient inquired about HIV testing and was advised that we no longer offer serum STI testing and she was advised to follow-up with [facility] or her primary care physician for these test.  Patient verbalized understanding no further questions

## 2023-07-27 PROCEDURE — 88304 TISSUE EXAM BY PATHOLOGIST: CPT | Performed by: OBSTETRICS & GYNECOLOGY

## 2023-07-28 ENCOUNTER — LAB REQUISITION (OUTPATIENT)
Dept: LAB | Facility: HOSPITAL | Age: 38
End: 2023-07-28
Payer: MEDICAID

## 2023-07-28 DIAGNOSIS — N90.7 VULVAR CYST: ICD-10-CM

## 2023-07-31 LAB
CYTO UR: NORMAL
LAB AP CASE REPORT: NORMAL
LAB AP CLINICAL INFORMATION: NORMAL
PATH REPORT.FINAL DX SPEC: NORMAL
PATH REPORT.GROSS SPEC: NORMAL

## 2024-08-08 PROCEDURE — 87591 N.GONORRHOEAE DNA AMP PROB: CPT | Performed by: EMERGENCY MEDICINE

## 2024-08-08 PROCEDURE — 87086 URINE CULTURE/COLONY COUNT: CPT | Performed by: EMERGENCY MEDICINE

## 2024-08-08 PROCEDURE — 87510 GARDNER VAG DNA DIR PROBE: CPT | Performed by: EMERGENCY MEDICINE

## 2024-08-08 PROCEDURE — 87491 CHLMYD TRACH DNA AMP PROBE: CPT | Performed by: EMERGENCY MEDICINE

## 2024-08-08 PROCEDURE — 87660 TRICHOMONAS VAGIN DIR PROBE: CPT | Performed by: EMERGENCY MEDICINE

## 2024-08-08 PROCEDURE — 87480 CANDIDA DNA DIR PROBE: CPT | Performed by: EMERGENCY MEDICINE

## 2024-08-09 ENCOUNTER — TELEPHONE (OUTPATIENT)
Dept: URGENT CARE | Facility: CLINIC | Age: 39
End: 2024-08-09
Payer: MEDICAID

## 2024-08-10 ENCOUNTER — TELEPHONE (OUTPATIENT)
Dept: URGENT CARE | Facility: CLINIC | Age: 39
End: 2024-08-10
Payer: MEDICAID

## 2024-08-10 DIAGNOSIS — B96.89 BACTERIAL VAGINOSIS: Primary | ICD-10-CM

## 2024-08-10 DIAGNOSIS — N76.0 BACTERIAL VAGINOSIS: Primary | ICD-10-CM

## 2024-08-10 RX ORDER — METRONIDAZOLE 500 MG/1
500 TABLET ORAL 2 TIMES DAILY
Qty: 14 TABLET | Refills: 0 | Status: SHIPPED | OUTPATIENT
Start: 2024-08-10 | End: 2024-08-17

## 2024-08-10 NOTE — TELEPHONE ENCOUNTER
Vaginal swab was positive for Gardernella so Flagyl has been sent in. Attempted to notify patient again by phone but no answer.

## 2024-10-10 PROCEDURE — 87480 CANDIDA DNA DIR PROBE: CPT | Performed by: NURSE PRACTITIONER

## 2024-10-10 PROCEDURE — 87510 GARDNER VAG DNA DIR PROBE: CPT | Performed by: NURSE PRACTITIONER

## 2024-10-10 PROCEDURE — 87660 TRICHOMONAS VAGIN DIR PROBE: CPT | Performed by: NURSE PRACTITIONER

## 2024-10-10 PROCEDURE — 87491 CHLMYD TRACH DNA AMP PROBE: CPT | Performed by: NURSE PRACTITIONER

## 2024-10-10 PROCEDURE — 87591 N.GONORRHOEAE DNA AMP PROB: CPT | Performed by: NURSE PRACTITIONER

## 2024-10-11 ENCOUNTER — PATIENT ROUNDING (BHMG ONLY) (OUTPATIENT)
Dept: URGENT CARE | Facility: CLINIC | Age: 39
End: 2024-10-11
Payer: MEDICAID

## 2024-10-11 DIAGNOSIS — N76.0 BACTERIAL VAGINOSIS: Primary | ICD-10-CM

## 2024-10-11 DIAGNOSIS — B96.89 BACTERIAL VAGINOSIS: Primary | ICD-10-CM

## 2024-10-11 RX ORDER — METRONIDAZOLE 500 MG/1
500 TABLET ORAL 2 TIMES DAILY
Qty: 14 TABLET | Refills: 0 | Status: SHIPPED | OUTPATIENT
Start: 2024-10-11 | End: 2024-10-18

## 2024-10-22 PROCEDURE — 87660 TRICHOMONAS VAGIN DIR PROBE: CPT

## 2024-10-22 PROCEDURE — 87086 URINE CULTURE/COLONY COUNT: CPT

## 2024-10-22 PROCEDURE — 87480 CANDIDA DNA DIR PROBE: CPT

## 2024-10-22 PROCEDURE — 87510 GARDNER VAG DNA DIR PROBE: CPT

## 2024-10-23 ENCOUNTER — PATIENT ROUNDING (BHMG ONLY) (OUTPATIENT)
Dept: URGENT CARE | Facility: CLINIC | Age: 39
End: 2024-10-23
Payer: MEDICAID

## 2024-10-23 DIAGNOSIS — N76.0 BACTERIAL VAGINOSIS: Primary | ICD-10-CM

## 2024-10-23 DIAGNOSIS — B96.89 BACTERIAL VAGINOSIS: Primary | ICD-10-CM

## 2024-10-23 RX ORDER — METRONIDAZOLE 500 MG/1
500 TABLET ORAL 2 TIMES DAILY
Qty: 14 TABLET | Refills: 0 | Status: SHIPPED | OUTPATIENT
Start: 2024-10-23 | End: 2024-10-30

## 2024-10-23 NOTE — ED NOTES
Thank you for letting us care for you in your recent visit to our urgent care center. We would love to hear about your experience with us. Was this the first time you have visited our location?    We’re always looking for ways to make our patients’ experiences even better. Do you have any recommendations on ways we may improve?     I appreciate you taking the time to respond. Please be on the lookout for a survey about your recent visit from [email or web address] via text or email. We would greatly appreciate if you could fill that out and turn it back in. We want your voice to be heard and we value your feedback.   Thank you for choosing [facility] for your healthcare needs.

## 2025-03-21 ENCOUNTER — PREP FOR SURGERY (OUTPATIENT)
Dept: OTHER | Facility: HOSPITAL | Age: 40
End: 2025-03-21
Payer: COMMERCIAL

## 2025-03-21 ENCOUNTER — OFFICE VISIT (OUTPATIENT)
Dept: OBSTETRICS AND GYNECOLOGY | Facility: CLINIC | Age: 40
End: 2025-03-21
Payer: COMMERCIAL

## 2025-03-21 VITALS
DIASTOLIC BLOOD PRESSURE: 92 MMHG | HEART RATE: 76 BPM | WEIGHT: 231 LBS | SYSTOLIC BLOOD PRESSURE: 149 MMHG | BODY MASS INDEX: 35.01 KG/M2 | HEIGHT: 68 IN

## 2025-03-21 DIAGNOSIS — Z01.419 WELL WOMAN EXAM WITH ROUTINE GYNECOLOGICAL EXAM: ICD-10-CM

## 2025-03-21 DIAGNOSIS — N90.7 LABIAL CYST: Primary | ICD-10-CM

## 2025-03-21 DIAGNOSIS — N89.8 VAGINAL CYST: Primary | ICD-10-CM

## 2025-03-21 PROCEDURE — 87624 HPV HI-RISK TYP POOLED RSLT: CPT | Performed by: OBSTETRICS & GYNECOLOGY

## 2025-03-21 PROCEDURE — G0123 SCREEN CERV/VAG THIN LAYER: HCPCS | Performed by: OBSTETRICS & GYNECOLOGY

## 2025-03-21 RX ORDER — SODIUM CHLORIDE 0.9 % (FLUSH) 0.9 %
10 SYRINGE (ML) INJECTION AS NEEDED
OUTPATIENT
Start: 2025-03-21

## 2025-03-21 RX ORDER — VALACYCLOVIR HYDROCHLORIDE 500 MG/1
1 TABLET, FILM COATED ORAL EVERY 12 HOURS SCHEDULED
COMMUNITY
Start: 2024-12-11

## 2025-03-21 RX ORDER — FLUOXETINE 10 MG/1
10 CAPSULE ORAL EVERY MORNING
COMMUNITY
Start: 2024-12-11

## 2025-03-21 RX ORDER — SODIUM CHLORIDE 9 MG/ML
40 INJECTION, SOLUTION INTRAVENOUS AS NEEDED
OUTPATIENT
Start: 2025-03-21

## 2025-03-21 RX ORDER — AMLODIPINE BESYLATE 5 MG/1
TABLET ORAL
COMMUNITY
Start: 2024-12-11

## 2025-03-21 RX ORDER — SODIUM CHLORIDE 0.9 % (FLUSH) 0.9 %
3 SYRINGE (ML) INJECTION EVERY 12 HOURS SCHEDULED
OUTPATIENT
Start: 2025-03-21

## 2025-03-21 NOTE — PROGRESS NOTES
"Well Woman Visit    CC: Scheduled annual well gyn visit  Chief Complaint   Patient presents with    Gynecologic Exam     Vulvar cysts         HPI:   39 y.o. who presents today for annual exam. Patient states periods are monthly, less than 5-7 days.  She is sexually active with one male partner. She denies any H/O STDS.  She denies any pain with intercourse. She denies any family H/O breast, uterine or ovarian cancer. She denies any vaginal odor, vaginal discharge, dysuria/hematuria, F/C, D/C, N/V, CP or SOB. Patient reports that she is not currently experiencing any symptoms of urinary incontinence. She does complain of a left labial cyst that has been there for years now, she states it comes and goes and is painful when its swollen. She wishes to have this removed. We discussed in office verses OR and she would like to have it removed in the OR.     Risks and benefits and alternatives of surgery were discussed with patient.  Risks are not limited to anesthesia, bleeding, blood transfusion, infection, damage to surrounding organs, wound separation, re-operation, thromboembolic disease, death.      History: PMHx, Meds, Allergies, PSHx, Social Hx, and POBHx all reviewed and updated.    ROS:  Review of Systems - General ROS: negative  Psychological ROS: negative  Endocrine ROS: negative  Breast ROS: negative  Respiratory ROS: negative  Cardiovascular ROS: negative  Gastrointestinal ROS: negative  Genito-Urinary ROS: positive for - vulvar/vaginal symptoms  Musculoskeletal ROS: negative  Neurological ROS: negative  Dermatological ROS: negative        PHYSICAL EXAM:      /92   Pulse 76   Ht 172.7 cm (68\")   Wt 105 kg (231 lb)   LMP 2025 (Exact Date)   Breastfeeding No   BMI 35.12 kg/m²  Not found.    General- NAD, alert and oriented, appropriate  Psych- Normal mood, good memory  Neck- No masses, no thyroid enlargement  CV- Regular rhythm, no murnurs  Resp- CTA to bases, no wheezes  Abdomen- Soft, " non distended, non tender, no masses    Breast Exam: Breast self awareness counseled  Breast left-  Bilaterally symmetrical, no masses, non tender, no nipple discharge  Breast right- Bilaterally symmetrical, no masses, non tender, no nipple discharge    Pelvic Exam:   External genitalia- Normal female, no lesions  Urethra/meatus- Normal, no masses, non tender  Bladder- Normal, no masses, non tender  Vagina- Normal, no atrophy, small 1mm X 1mm left labial inclusion cyst  Cvx- Normal, no lesions, no discharge, No cervical motion tenderness  Uterus- Normal size, shape & consistency.  Non tender, mobile.  Adnexa- No mass, non tender  Anus/Rectum/Perineum- Not performed    Chaperone present for pelvic exam       Lymphatic- No palpable neck, axillary, or groin nodes  Ext- No edema, no cyanosis    Skin- No lesions, no rashes, no acanthosis nigricans  =    ASSESSMENT and PLAN:    Diagnoses and all orders for this visit:    1. Vaginal cyst (Primary)    2. Well woman exam with routine gynecological exam  -     IgP, Aptima HPV  -     Mammo Screening Digital Tomosynthesis Bilateral With CAD; Future        Preventative:  1. Annuals every year; Cytology collections per prevailing guidelines.   2. Mammograms begin every year at 41 yo if no abnormalities are found and no family history.  3. Bone density studies begin at 64 yo. If no fracture history or osteoporosis family history.  4. Colonoscopy begins at 46 yo. Repeat every ten years if negative and no family history.  5. Calcium of 9309-1681 mg/day in split dosing  6. Vitamin D 400-800 IU/day  7. All other preventative health recommendations will be managed by the patients Primary care physician.    She understands the importance of having any ordered tests to be performed in a timely fashion.  The risks of not performing them include, but are not limited to, advanced cancer stages, bone loss from osteoporosis and/or subsequent increase in morbidity and/or mortality.  She is  encouraged to review her results online and/or contact or office if she has questions.     Follow Up:  Return in about 2 weeks (around 4/4/2025) for Post op visit.    I spent 20 minutes on the separately reported service of Annual with problems. This time is not included in the time used to support the E/M service also reported today.        Candace Mercedes,   03/21/2025    [facility]  [facility] [place] [address]  Dept: [phone]  Dept Fax: [phone]  Loc: [phone]  Loc Fax: [phone]

## 2025-03-25 LAB
CYTOLOGIST CVX/VAG CYTO: NORMAL
CYTOLOGY CVX/VAG DOC CYTO: NORMAL
CYTOLOGY CVX/VAG DOC THIN PREP: NORMAL
DX ICD CODE: NORMAL
HPV I/H RISK 4 DNA CVX QL PROBE+SIG AMP: NEGATIVE
OTHER STN SPEC: NORMAL
SERVICE CMNT-IMP: NORMAL
STAT OF ADQ CVX/VAG CYTO-IMP: NORMAL

## 2025-04-25 NOTE — PRE-PROCEDURE INSTRUCTIONS
PATIENT INSTRUCTED TO BE:    - NOTHING TO EAT AFTER MIDNIGHT OR CHEW, EXCEPT CAN HAVE SIPS OF WATER WITH MEDICATIONS OR CLEAR LIQUIDS 2 HOURS PRIOR TO SURGERY ARRIVAL TIME , NO MORE THAN 8 OZ. (NOTHING RED)     - TO HOLD ALL VITAMINS, SUPPLEMENTS, NSAIDS FOR ONE WEEK PRIOR TO THEIR SURGICAL PROCEDURE    - DO NOT TAKE ______________________ 7 DAYS PRIOR TO PROCEDURE PER ANESTHESIA RECOMMENDATIONS/INSTRUCTIONS     - INSTRUCTED PT TO USE SURGICAL SOAP 1 TIME THE NIGHT PRIOR TO SURGERY ___________ OR THE AM OF SURGERY _____________   USE THE SOAP FROM NECK TO TOES, AVOID THEIR FACE, HAIR, AND PRIVATE PARTS. IF USE THE SOAP THE NIGHT PRIOR TO SURGERY, CHANGE BED LINENS AND NO PETS IN THE BED.     INSTRUCTED NO LOTIONS, JEWELRY, PIERCINGS,  NAIL POLISH, OR DEODORANT DAY OF SURGERY    - IF DIABETIC, CHECK BLOOD GLUCOSE IF LESS THAN 70 OR HAVING SYMPTOMS CALL THE PREOP AREA FOR INSTRUCTIONS ON AM OF SURGERY ([phone]   -INSTRUCTED TO TAKE THE FOLLOWING MEDICATIONS THE DAY OF SURGERY WITH SIPS OF WATER:     PROZAC, FLONASE, VALTREX      - DO NOT BRING ANY MEDICATIONS WITH YOU TO THE HOSPITAL THE DAY OF SURGERY, EXCEPT IF USE INHALERS. BRING INHALERS DAY OF SURGERY       - BRING CPAP OR BIPAP TO THE HOSPITAL ONLY IF YOU ARE SPENDING THE NIGHT    - DO NOT SMOKE OR VAPE 24 HOURS PRIOR TO PROCEDURE PER ANESTHESIA REQUEST     -MAKE SURE YOU HAVE A RIDE HOME OR SOMEONE TO STAY WITH YOU THE DAY OF THE PROCEDURE AFTER YOU GO HOME     - FOLLOW ANY OTHER INSTRUCTIONS GIVEN TO YOU BY YOUR SURGEON'S OFFICE.     - DAY OF SURGERY ____________,[facility] ( [address]), YOU CAN  PARK OR SELF PARK. ENTER THE PAVILION THRU MAIN ENTRANCE, TAKE ELEVATORS TO THE FIRST FLOOR, CHECK IN AT THE DESK FOR REGISTRATION/ SURGERY.  - YOU WILL RECEIVE A PHONE CALL THE DAY PRIOR TO SURGERY BETWEEN 1PM AND 4 PM WITH ARRIVAL TIME, IF YOUR SURGERY IS ON A MONDAY YOU WILL RECEIVE A CALL THE FRIDAY PRIOR TO SURGERY  DATE    - BRING CASH OR CREDIT CARD FOR COPAYMENT OF MEDICATIONS AFTER SURGERY IF YOU USE THE HOSPITAL PHARMACY (MED[facility] TO BED)    - PREADMISSION TESTING NURSE [phone] IF HAVE ANY QUESTIONS     -PATIENT PROVIDED THE NUMBER FOR PREOP SURGICAL DEPT IF HAD QUESTIONS AFTER HOURS PRIOR TO SURGERY ([phone] INFORMED PT IF NO ANSWER, LEAVE A MESSAGE AND SOMEONE WILL RETURN THEIR CALL       PATIENT VERBALIZED UNDERSTANDING

## 2025-04-27 NOTE — H&P
[facility]   PREOPERATIVE HISTORY AND PHYSICAL    Patient Name:Terri Aguilar  : 1985  MRN: [id number]  Primary Care Physician: Dee Keene APRN  Date of admission: (Not on file)    Subjective   Subjective     Chief Complaint: preoperative evaluation    History of Present Illness  Terri Aguilar is a 39 y.o. female  who presents for preoperative evaluation. She is scheduled for labial cyst removal. incison and removal of cyst from labia (N/A) due to continues pain.     Patient Active Problem List   Diagnosis    Urethritis    Labial cyst       Review of Systems   Constitutional: Negative.    HENT: Negative.     Eyes: Negative.    Respiratory: Negative.     Cardiovascular: Negative.    Gastrointestinal: Negative.    Endocrine: Negative.    Genitourinary: Negative.    Musculoskeletal: Negative.    Allergic/Immunologic: Negative.    Neurological: Negative.    Hematological: Negative.    Psychiatric/Behavioral: Negative.          Personal History     Past Medical History:   Diagnosis Date    Anxiety and depression     Herpes     Labial cyst        Past Surgical History:   Procedure Laterality Date    CYST REMOVAL      cyst removal from neck       Obstetric History:  OB History          5    Para   5    Term   5            AB        Living   5         SAB        IAB        Ectopic        Molar        Multiple        Live Births   5               Menstrual History:     Patient's last menstrual period was 2025.       # 1 - Date: , Sex: Female, Weight: None, GA: None, Type: Vaginal, Spontaneous, Apgar1: None, Apgar5: None, Living: Living, Birth Comments: None    # 2 - Date: , Sex: Female, Weight: None, GA: None, Type: Vaginal, Spontaneous, Apgar1: None, Apgar5: None, Living: Living, Birth Comments: None    # 3 - Date: , Sex: Female, Weight: None, GA: None, Type: Vaginal, Spontaneous, Apgar1: None, Apgar5: None, Living: Living, Birth Comments: None    # 4 -  Date: 2009, Sex: Male, Weight: None, GA: None, Type: Vaginal, Spontaneous, Apgar1: None, Apgar5: None, Living: Living, Birth Comments: None    # 5 - Date: 2012, Sex: Male, Weight: None, GA: None, Type: Vaginal, Spontaneous, Apgar1: None, Apgar5: None, Living: Living, Birth Comments: None      Family History: Her family history includes Bone cancer in her maternal grandfather.     Social History: She  reports that she has been smoking cigarettes. She has a 7.5 pack-year smoking history. She has been exposed to tobacco smoke. She has never used smokeless tobacco. She reports current alcohol use. She reports that she does not use drugs.    Home Medications:  FLUoxetine, fluticasone, and valACYclovir    Allergies:  She has no known allergies.    Objective    Objective          Physical Exam  Constitutional:       Appearance: Normal appearance.   Cardiovascular:      Rate and Rhythm: Normal rate and regular rhythm.      Pulses: Normal pulses.      Heart sounds: Normal heart sounds.   Musculoskeletal:      Cervical back: Normal range of motion and neck supple.   Skin:     General: Skin is warm and dry.   Neurological:      General: No focal deficit present.      Mental Status: She is alert and oriented to person, place, and time.         Assessment & Plan   Assessment / Plan     Brief Patient Summary:  Terri Aguilar is a 39 y.o. female who presents for preoperative evaluation.    Pre-Op Diagnosis Codes:      * Labial cyst [N90.7]    Active Hospital Problems:  Active Hospital Problems    Diagnosis     **Labial cyst      Plan:   Procedure(s):  labial cyst removal. incison and removal of cyst from labia    The risks, benefits, and alternatives of the procedure are reviewed with the patient including but not limited to bleeding, infection and damage to internal organs.    Questions and concerns were addressed.     Candace Mercedes DO

## 2025-04-28 ENCOUNTER — ANESTHESIA EVENT (OUTPATIENT)
Dept: PERIOP | Facility: HOSPITAL | Age: 40
End: 2025-04-28
Payer: COMMERCIAL

## 2025-04-28 ENCOUNTER — ANESTHESIA (OUTPATIENT)
Dept: PERIOP | Facility: HOSPITAL | Age: 40
End: 2025-04-28
Payer: COMMERCIAL

## 2025-04-28 ENCOUNTER — HOSPITAL ENCOUNTER (OUTPATIENT)
Facility: HOSPITAL | Age: 40
Setting detail: HOSPITAL OUTPATIENT SURGERY
Discharge: HOME | End: 2025-04-28
Attending: OBSTETRICS & GYNECOLOGY | Admitting: OBSTETRICS & GYNECOLOGY
Payer: MEDICAID

## 2025-04-28 VITALS
HEART RATE: 53 BPM | DIASTOLIC BLOOD PRESSURE: 78 MMHG | OXYGEN SATURATION: 100 % | BODY MASS INDEX: 33.55 KG/M2 | SYSTOLIC BLOOD PRESSURE: 136 MMHG | WEIGHT: 221.34 LBS | HEIGHT: 68 IN | RESPIRATION RATE: 12 BRPM | TEMPERATURE: 98.4 F

## 2025-04-28 DIAGNOSIS — N90.7 LABIAL CYST: ICD-10-CM

## 2025-04-28 PROCEDURE — 25010000002 PROPOFOL 10 MG/ML EMULSION: Performed by: NURSE ANESTHETIST, CERTIFIED REGISTERED

## 2025-04-28 PROCEDURE — 25010000002 ONDANSETRON PER 1 MG: Performed by: NURSE ANESTHETIST, CERTIFIED REGISTERED

## 2025-04-28 PROCEDURE — 11420 EXC H-F-NK-SP B9+MARG 0.5/<: CPT | Performed by: OBSTETRICS & GYNECOLOGY

## 2025-04-28 PROCEDURE — 25010000002 MIDAZOLAM PER 1MG: Performed by: ANESTHESIOLOGY

## 2025-04-28 PROCEDURE — 25010000002 FENTANYL CITRATE (PF) 50 MCG/ML SOLUTION: Performed by: NURSE ANESTHETIST, CERTIFIED REGISTERED

## 2025-04-28 PROCEDURE — 25010000002 LIDOCAINE 1 % SOLUTION: Performed by: OBSTETRICS & GYNECOLOGY

## 2025-04-28 PROCEDURE — 25810000003 LACTATED RINGERS PER 1000 ML: Performed by: ANESTHESIOLOGY

## 2025-04-28 PROCEDURE — 25010000002 DEXAMETHASONE PER 1 MG: Performed by: NURSE ANESTHETIST, CERTIFIED REGISTERED

## 2025-04-28 PROCEDURE — 87205 SMEAR GRAM STAIN: CPT | Performed by: OBSTETRICS & GYNECOLOGY

## 2025-04-28 PROCEDURE — 87075 CULTR BACTERIA EXCEPT BLOOD: CPT | Performed by: OBSTETRICS & GYNECOLOGY

## 2025-04-28 PROCEDURE — 87070 CULTURE OTHR SPECIMN AEROBIC: CPT | Performed by: OBSTETRICS & GYNECOLOGY

## 2025-04-28 PROCEDURE — 25010000002 LIDOCAINE PF 2% 2 % SOLUTION: Performed by: NURSE ANESTHETIST, CERTIFIED REGISTERED

## 2025-04-28 RX ORDER — LIDOCAINE HYDROCHLORIDE 20 MG/ML
INJECTION, SOLUTION EPIDURAL; INFILTRATION; INTRACAUDAL; PERINEURAL AS NEEDED
Status: DISCONTINUED | OUTPATIENT
Start: 2025-04-28 | End: 2025-04-28 | Stop reason: SURG

## 2025-04-28 RX ORDER — PROMETHAZINE HYDROCHLORIDE 25 MG/1
25 TABLET ORAL ONCE AS NEEDED
Status: DISCONTINUED | OUTPATIENT
Start: 2025-04-28 | End: 2025-04-28 | Stop reason: HOSPADM

## 2025-04-28 RX ORDER — SODIUM CHLORIDE 0.9 % (FLUSH) 0.9 %
3 SYRINGE (ML) INJECTION EVERY 12 HOURS SCHEDULED
Status: DISCONTINUED | OUTPATIENT
Start: 2025-04-28 | End: 2025-04-28 | Stop reason: HOSPADM

## 2025-04-28 RX ORDER — DEXAMETHASONE SODIUM PHOSPHATE 4 MG/ML
INJECTION, SOLUTION INTRA-ARTICULAR; INTRALESIONAL; INTRAMUSCULAR; INTRAVENOUS; SOFT TISSUE AS NEEDED
Status: DISCONTINUED | OUTPATIENT
Start: 2025-04-28 | End: 2025-04-28 | Stop reason: SURG

## 2025-04-28 RX ORDER — OXYCODONE HYDROCHLORIDE 5 MG/1
5 TABLET ORAL
Status: DISCONTINUED | OUTPATIENT
Start: 2025-04-28 | End: 2025-04-28 | Stop reason: HOSPADM

## 2025-04-28 RX ORDER — MAGNESIUM HYDROXIDE 1200 MG/15ML
LIQUID ORAL AS NEEDED
Status: DISCONTINUED | OUTPATIENT
Start: 2025-04-28 | End: 2025-04-28 | Stop reason: HOSPADM

## 2025-04-28 RX ORDER — PROPOFOL 10 MG/ML
VIAL (ML) INTRAVENOUS AS NEEDED
Status: DISCONTINUED | OUTPATIENT
Start: 2025-04-28 | End: 2025-04-28 | Stop reason: SURG

## 2025-04-28 RX ORDER — SODIUM CHLORIDE 9 MG/ML
40 INJECTION, SOLUTION INTRAVENOUS AS NEEDED
Status: DISCONTINUED | OUTPATIENT
Start: 2025-04-28 | End: 2025-04-28 | Stop reason: HOSPADM

## 2025-04-28 RX ORDER — SODIUM CHLORIDE 0.9 % (FLUSH) 0.9 %
10 SYRINGE (ML) INJECTION AS NEEDED
Status: DISCONTINUED | OUTPATIENT
Start: 2025-04-28 | End: 2025-04-28 | Stop reason: HOSPADM

## 2025-04-28 RX ORDER — MIDAZOLAM HYDROCHLORIDE 2 MG/2ML
2 INJECTION, SOLUTION INTRAMUSCULAR; INTRAVENOUS ONCE
Status: COMPLETED | OUTPATIENT
Start: 2025-04-28 | End: 2025-04-28

## 2025-04-28 RX ORDER — ONDANSETRON 2 MG/ML
4 INJECTION INTRAMUSCULAR; INTRAVENOUS ONCE AS NEEDED
Status: DISCONTINUED | OUTPATIENT
Start: 2025-04-28 | End: 2025-04-28 | Stop reason: HOSPADM

## 2025-04-28 RX ORDER — ACETAMINOPHEN 500 MG
1000 TABLET ORAL ONCE
Status: COMPLETED | OUTPATIENT
Start: 2025-04-28 | End: 2025-04-28

## 2025-04-28 RX ORDER — PROMETHAZINE HYDROCHLORIDE 25 MG/1
25 SUPPOSITORY RECTAL ONCE AS NEEDED
Status: DISCONTINUED | OUTPATIENT
Start: 2025-04-28 | End: 2025-04-28 | Stop reason: HOSPADM

## 2025-04-28 RX ORDER — SODIUM CHLORIDE, SODIUM LACTATE, POTASSIUM CHLORIDE, CALCIUM CHLORIDE 600; 310; 30; 20 MG/100ML; MG/100ML; MG/100ML; MG/100ML
9 INJECTION, SOLUTION INTRAVENOUS CONTINUOUS PRN
Status: DISCONTINUED | OUTPATIENT
Start: 2025-04-28 | End: 2025-04-28 | Stop reason: HOSPADM

## 2025-04-28 RX ORDER — ONDANSETRON 2 MG/ML
INJECTION INTRAMUSCULAR; INTRAVENOUS AS NEEDED
Status: DISCONTINUED | OUTPATIENT
Start: 2025-04-28 | End: 2025-04-28 | Stop reason: SURG

## 2025-04-28 RX ORDER — FENTANYL CITRATE 50 UG/ML
INJECTION, SOLUTION INTRAMUSCULAR; INTRAVENOUS AS NEEDED
Status: DISCONTINUED | OUTPATIENT
Start: 2025-04-28 | End: 2025-04-28 | Stop reason: SURG

## 2025-04-28 RX ORDER — LIDOCAINE HYDROCHLORIDE 10 MG/ML
INJECTION, SOLUTION INFILTRATION; PERINEURAL AS NEEDED
Status: DISCONTINUED | OUTPATIENT
Start: 2025-04-28 | End: 2025-04-28 | Stop reason: HOSPADM

## 2025-04-28 RX ADMIN — SODIUM CHLORIDE, POTASSIUM CHLORIDE, SODIUM LACTATE AND CALCIUM CHLORIDE 9 ML/HR: 600; 310; 30; 20 INJECTION, SOLUTION INTRAVENOUS at 12:02

## 2025-04-28 RX ADMIN — PROPOFOL 200 MG: 10 INJECTION, EMULSION INTRAVENOUS at 13:46

## 2025-04-28 RX ADMIN — FENTANYL CITRATE 100 MCG: 50 INJECTION, SOLUTION INTRAMUSCULAR; INTRAVENOUS at 13:50

## 2025-04-28 RX ADMIN — OXYCODONE HYDROCHLORIDE 5 MG: 5 TABLET ORAL at 15:03

## 2025-04-28 RX ADMIN — ACETAMINOPHEN 1000 MG: 500 TABLET ORAL at 12:01

## 2025-04-28 RX ADMIN — MIDAZOLAM HYDROCHLORIDE 2 MG: 1 INJECTION, SOLUTION INTRAMUSCULAR; INTRAVENOUS at 13:37

## 2025-04-28 RX ADMIN — SODIUM CHLORIDE, POTASSIUM CHLORIDE, SODIUM LACTATE AND CALCIUM CHLORIDE: 600; 310; 30; 20 INJECTION, SOLUTION INTRAVENOUS at 14:03

## 2025-04-28 RX ADMIN — LIDOCAINE HYDROCHLORIDE 100 MG: 20 INJECTION, SOLUTION EPIDURAL; INFILTRATION; INTRACAUDAL; PERINEURAL at 13:46

## 2025-04-28 RX ADMIN — ONDANSETRON 4 MG: 2 INJECTION INTRAMUSCULAR; INTRAVENOUS at 14:02

## 2025-04-28 RX ADMIN — DEXAMETHASONE SODIUM PHOSPHATE 8 MG: 4 INJECTION, SOLUTION INTRAMUSCULAR; INTRAVENOUS at 14:03

## 2025-04-28 NOTE — ANESTHESIA PREPROCEDURE EVALUATION
Anesthesia Evaluation     Patient summary reviewed and Nursing notes reviewed                Airway   Mallampati: I  TM distance: >3 FB  Neck ROM: full  No difficulty expected  Dental      Pulmonary - negative pulmonary ROS and normal exam    breath sounds clear to auscultation  Cardiovascular - negative cardio ROS and normal exam    Rhythm: regular  Rate: normal        Neuro/Psych- negative ROS  (+) psychiatric history Anxiety and Depression  GI/Hepatic/Renal/Endo - negative ROS   (+) obesity    Musculoskeletal (-) negative ROS    Abdominal    Substance History - negative use     OB/GYN negative ob/gyn ROS         Other                    Anesthesia Plan    ASA 1     general     intravenous induction     Anesthetic plan, risks, benefits, and alternatives have been provided, discussed and informed consent has been obtained with: patient.    CODE STATUS:    Code Status (Patient has no pulse and is not breathing): CPR (Attempt to Resuscitate)  Medical Interventions (Patient has pulse or is breathing): Full Support

## 2025-04-28 NOTE — OP NOTE
"PREOP DIAGNOSIS: Right labial cyst removal     POSTOP DIAGNOSIS: Same     PROCEDURE: Resection of right labial cyst     SURGEON: Candace Mercedes DO    ANESTHESIA PROVIDER: Nj Jackson MD    ANESTHESIA TYPE: MAC    EBL: Minimal     SPECIMEN: Labial cyst culture     COMPLICATIONS: None     DISPOSITION: To recovery room in stable condition     The patient was taken to the operating room where general anesthesia was administered and found to be adequate. She was placed in the candy cane stirrups and prepped and draped in the usual sterile fashion.     The cyst was isolated. An incision was made in the epitheium and the cyst was \"shelled out\" sharply.     The cyst was 1mm X 1mm.  The wound was closed in multiple layers with 3-0 vicryl and the skin was closed with 3-0 monocryl.     All instruments were removed from the patient's vagina. The patient was awakened in the operating room, taken to the recovery room in stable condition with Zhao catheter in place. Sponge, lap, needle, and instrument counts were correct x 2.     Pt was discharged form PACU in stable condition with script for pain medication and instructions to follow up in the office in 2 weeks.    Electronically signed by:    Candace Mercedes DO  04/28/25  14:09 EDT        "

## 2025-04-28 NOTE — DISCHARGE INSTRUCTIONS
DISCHARGE INSTRUCTIONS  GYNECOLOGICAL  PROCEDURES      For your surgery you had:  General anesthesia (you may have a sore throat for the first 24 hours)  You may experience dizziness, drowsiness, or lightheadedness for several hours following surgery.  Do not stay alone today or tonight.  Limit your activity for 24 hours.  Resume your diet slowly.  Follow any special dietary instructions you may have been given by your doctor.  You should not drive or operate machinery, drink alcohol, or sign legally binding documents for 24 hours or while you are taking pain medication.  Last dose of pain medication was given at:TYLENOL 1000 MG AT 12:01,  OXY IR 5 MG AT 3:03  NOTIFY YOUR DOCTOR IF YOU EXPERIENCE ANY OF THE FOLLOWING:  Temperature greater than 101 degrees Fahrenheit  Shaking Chills  Redness or excessive drainage from incision  Nausea, vomiting and/or pain that is not controlled by prescribed medications  Increase in bleeding or bleeding that is excessive  Unable to urinate in 6 hours after surgery  If unable to reach your doctor, please go to the closest Emergency Room    [x] You may resume intercourse and the use of tampons as your physician has instructed you.  [x] Surgical site drainage may be expected for several days decreasing with time  If you have foul smelling drainage, notify your physician.  Medications per physician instructions as indicated on Discharge Medication Information Sheet    SPECIAL INSTRUCTIONS:   FOLLOW ANY VERBAL INSTRUCTIONS GIVEN TO YOU BY

## 2025-04-28 NOTE — ANESTHESIA POSTPROCEDURE EVALUATION
Patient: Terri Aguilar    Procedure Summary       Date: 04/28/25 Room / Location: [facility] OR 11 / [facility] MAIN OR    Anesthesia Start: 1341 Anesthesia Stop: 1415    Procedure: labial cyst removal. incison and removal of cyst from labia Diagnosis:       Labial cyst      (Labial cyst [N90.7])    Surgeons: Candace Mercedes DO Provider: Nj Jackson MD    Anesthesia Type: general ASA Status: 1            Anesthesia Type: general    Vitals  Vitals Value Taken Time   /75 04/28/25 14:50   Temp 36.7 °C (98.1 °F) 04/28/25 14:35   Pulse 50 04/28/25 14:52   Resp 13 04/28/25 14:50   SpO2 100 % 04/28/25 14:52   Vitals shown include unfiled device data.        Post Anesthesia Care and Evaluation    Patient location during evaluation: bedside  Patient participation: complete - patient participated  Level of consciousness: awake  Pain management: adequate    Airway patency: patent  PONV Status: none  Cardiovascular status: acceptable and stable  Respiratory status: acceptable  Hydration status: acceptable

## 2025-04-28 NOTE — H&P
H&P reviewed. The patient was examined and there are no changes to the H&P.      Electronically signed by:    Candace Mercedes DO  04/28/25  12:33 EDT

## 2025-04-28 NOTE — H&P
H&P reviewed. The patient was examined and there are no changes to the H&P.        Electronically signed by:    Candace Mercedes DO  04/28/25  12:28 EDT

## 2025-05-01 LAB
BACTERIA SPEC AEROBE CULT: NORMAL
GRAM STN SPEC: NORMAL
GRAM STN SPEC: NORMAL

## 2025-05-03 LAB — BACTERIA SPEC ANAEROBE CULT: ABNORMAL

## 2025-05-22 ENCOUNTER — HOSPITAL ENCOUNTER (EMERGENCY)
Facility: HOSPITAL | Age: 40
Discharge: HOME | End: 2025-05-22
Attending: EMERGENCY MEDICINE
Payer: COMMERCIAL

## 2025-05-22 ENCOUNTER — APPOINTMENT (OUTPATIENT)
Dept: GENERAL RADIOLOGY | Facility: HOSPITAL | Age: 40
End: 2025-05-22
Payer: COMMERCIAL

## 2025-05-22 ENCOUNTER — APPOINTMENT (OUTPATIENT)
Dept: CT IMAGING | Facility: HOSPITAL | Age: 40
End: 2025-05-22
Payer: COMMERCIAL

## 2025-05-22 VITALS
OXYGEN SATURATION: 100 % | HEIGHT: 67 IN | DIASTOLIC BLOOD PRESSURE: 98 MMHG | TEMPERATURE: 97.8 F | WEIGHT: 220 LBS | RESPIRATION RATE: 20 BRPM | SYSTOLIC BLOOD PRESSURE: 177 MMHG | HEART RATE: 81 BPM | BODY MASS INDEX: 34.53 KG/M2

## 2025-05-22 DIAGNOSIS — M54.50 ACUTE MIDLINE LOW BACK PAIN WITHOUT SCIATICA: ICD-10-CM

## 2025-05-22 DIAGNOSIS — M54.2 ACUTE NECK PAIN: ICD-10-CM
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
## 2025-05-22 PROCEDURE — 97161 PT EVAL LOW COMPLEX 20 MIN: CPT | Performed by: PHYSICAL THERAPIST

## 2025-05-22 PROCEDURE — 72100 X-RAY EXAM L-S SPINE 2/3 VWS: CPT

## 2025-05-22 PROCEDURE — 25010000002 KETOROLAC TROMETHAMINE PER 15 MG

## 2025-05-22 PROCEDURE — 72125 CT NECK SPINE W/O DYE: CPT

## 2025-05-22 PROCEDURE — 99284 EMERGENCY DEPT VISIT MOD MDM: CPT

## 2025-05-22 PROCEDURE — 97110 THERAPEUTIC EXERCISES: CPT | Performed by: PHYSICAL THERAPIST

## 2025-05-22 PROCEDURE — 96372 THER/PROPH/DIAG INJ SC/IM: CPT

## 2025-05-22 RX ORDER — KETOROLAC TROMETHAMINE 30 MG/ML
60 INJECTION, SOLUTION INTRAMUSCULAR; INTRAVENOUS ONCE
Status: COMPLETED | OUTPATIENT
Start: 2025-05-22 | End: 2025-05-22

## 2025-05-22 RX ORDER — IBUPROFEN 800 MG/1
800 TABLET, FILM COATED ORAL EVERY 6 HOURS PRN
Qty: 30 TABLET | Refills: 0 | Status: SHIPPED | OUTPATIENT
Start: 2025-05-22

## 2025-05-22 RX ORDER — METHOCARBAMOL 750 MG/1
750 TABLET, FILM COATED ORAL 3 TIMES DAILY PRN
Qty: 20 TABLET | Refills: 0 | Status: SHIPPED | OUTPATIENT
Start: 2025-05-22

## 2025-05-22 RX ADMIN — KETOROLAC TROMETHAMINE 60 MG: 30 INJECTION, SOLUTION INTRAMUSCULAR at 10:40
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
## 2025-05-22 NOTE — DISCHARGE INSTRUCTIONS
Avoid any heavy lifting, pushing, or pulling for the next several days.  Try to do some gentle stretching and the therapeutic exercises that were shown to you by the physical therapist.  You may also try applying ice or heat to your sore areas.  Expect to feel an increase in your muscle soreness over the next couple of days.  Do not drive after taking the prescribed muscle relaxer, Robaxin, as it can make you drowsy.  Follow-up with your primary care provider.
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
## 2025-07-13 ENCOUNTER — APPOINTMENT (OUTPATIENT)
Dept: GENERAL RADIOLOGY | Facility: HOSPITAL | Age: 40
End: 2025-07-13
Payer: MEDICAID

## 2025-07-13 ENCOUNTER — HOSPITAL ENCOUNTER (OUTPATIENT)
Facility: HOSPITAL | Age: 40
Discharge: HOME | End: 2025-07-13
Attending: EMERGENCY MEDICINE | Admitting: EMERGENCY MEDICINE
Payer: MEDICAID

## 2025-07-13 VITALS
RESPIRATION RATE: 20 BRPM | SYSTOLIC BLOOD PRESSURE: 154 MMHG | BODY MASS INDEX: 30.31 KG/M2 | TEMPERATURE: 97.8 F | HEIGHT: 68 IN | OXYGEN SATURATION: 100 % | HEART RATE: 126 BPM | DIASTOLIC BLOOD PRESSURE: 103 MMHG | WEIGHT: 200 LBS

## 2025-07-13 DIAGNOSIS — J01.00 ACUTE NON-RECURRENT MAXILLARY SINUSITIS: Primary | ICD-10-CM

## 2025-07-13 LAB
FLUAV SUBTYP SPEC NAA+PROBE: NOT DETECTED
FLUBV RNA NPH QL NAA+NON-PROBE: NOT DETECTED
SARS-COV-2 RNA RESP QL NAA+PROBE: NOT DETECTED

## 2025-07-13 PROCEDURE — 87636 SARSCOV2 & INF A&B AMP PRB: CPT | Performed by: EMERGENCY MEDICINE

## 2025-07-13 PROCEDURE — 63710000001 ONDANSETRON ODT 4 MG TABLET DISPERSIBLE: Performed by: EMERGENCY MEDICINE

## 2025-07-13 PROCEDURE — 71046 X-RAY EXAM CHEST 2 VIEWS: CPT

## 2025-07-13 PROCEDURE — G0463 HOSPITAL OUTPT CLINIC VISIT: HCPCS | Performed by: EMERGENCY MEDICINE

## 2025-07-13 RX ORDER — ONDANSETRON 4 MG/1
4 TABLET, ORALLY DISINTEGRATING ORAL ONCE
Status: COMPLETED | OUTPATIENT
Start: 2025-07-13 | End: 2025-07-13

## 2025-07-13 RX ORDER — CEFDINIR 300 MG/1
300 CAPSULE ORAL 2 TIMES DAILY
Qty: 14 CAPSULE | Refills: 0 | Status: SHIPPED | OUTPATIENT
Start: 2025-07-13 | End: 2025-07-20

## 2025-07-13 RX ORDER — ONDANSETRON 4 MG/1
4 TABLET, ORALLY DISINTEGRATING ORAL EVERY 6 HOURS PRN
Qty: 10 TABLET | Refills: 0 | Status: SHIPPED | OUTPATIENT
Start: 2025-07-13

## 2025-07-13 RX ORDER — CEPHALEXIN 500 MG/1
500 CAPSULE ORAL ONCE
Status: COMPLETED | OUTPATIENT
Start: 2025-07-13 | End: 2025-07-13

## 2025-07-13 RX ORDER — IBUPROFEN 600 MG/1
600 TABLET, FILM COATED ORAL ONCE
Status: COMPLETED | OUTPATIENT
Start: 2025-07-13 | End: 2025-07-13

## 2025-07-13 RX ADMIN — ONDANSETRON 4 MG: 4 TABLET, ORALLY DISINTEGRATING ORAL at 11:16

## 2025-07-13 RX ADMIN — IBUPROFEN 600 MG: 600 TABLET ORAL at 11:31

## 2025-07-13 RX ADMIN — CEPHALEXIN 500 MG: 500 CAPSULE ORAL at 12:07

## 2025-07-13 NOTE — FS[facility] PROVIDER NOTE
Subjective   History of Present Illness  39-year-old female whose had a cough for almost a week with clear discharge from her nose.  Cough is gotten worse and it makes her nauseated sometimes to cough.  She has also noticed in the past 24 hours she feels worse with a frontal headache no stiff or sore neck because she is able to flex her neck, deeper cough, body aches, fatigue.  No abdominal pain or back pain, no neck pain or rash no known fever at home.  No other symptoms.  Has not had pneumonia before and has not been around anyone else who has been ill like this.      Review of Systems    Past Medical History:   Diagnosis Date    Anxiety and depression     Herpes     Labial cyst        No Known Allergies    Past Surgical History:   Procedure Laterality Date    CYST REMOVAL      cyst removal from neck    TUBAL ABDOMINAL LIGATION         Family History   Problem Relation Age of Onset    Bone cancer Maternal Grandfather     Breast cancer Neg Hx     Ovarian cancer Neg Hx     Uterine cancer Neg Hx     Colon cancer Neg Hx     Malig Hyperthermia Neg Hx        Social History     Socioeconomic History    Marital status:    Tobacco Use    Smoking status: Every Day     Current packs/day: 0.50     Average packs/day: 0.5 packs/day for 15.0 years (7.5 ttl pk-yrs)     Types: Cigarettes     Passive exposure: Current    Smokeless tobacco: Never    Tobacco comments:     LAST SMOKED 4/27/25   Vaping Use    Vaping status: Former   Substance and Sexual Activity    Alcohol use: Yes     Comment: OCC    Drug use: Never    Sexual activity: Yes     Birth control/protection: None           Objective   Physical Exam  Vitals and nursing note reviewed.   Constitutional:       General: She is in acute distress.      Appearance: She is obese. She is ill-appearing.   Eyes:      Extraocular Movements: Extraocular movements intact.      Conjunctiva/sclera: Conjunctivae normal.   Cardiovascular:      Rate and Rhythm: Normal rate and regular  rhythm.      Pulses: Normal pulses.      Heart sounds: No murmur heard.  Pulmonary:      Effort: Pulmonary effort is normal. No respiratory distress.      Breath sounds: No stridor. Rhonchi present. No wheezing or rales.      Comments: Right upper anterior rhonchi  Musculoskeletal:         General: Normal range of motion.      Cervical back: Neck supple.   Skin:     General: Skin is warm and dry.      Capillary Refill: Capillary refill takes less than 2 seconds.   Neurological:      General: No focal deficit present.      Mental Status: She is alert and oriented to person, place, and time. Mental status is at baseline.   Psychiatric:         Mood and Affect: Mood normal.         Behavior: Behavior normal.         Thought Content: Thought content normal.         Judgment: Judgment normal.         Procedures           ED Course  ED Course as of 07/13/25 1215   Sun Jul 13, 2025   1153 COVID flu neg, chest xray neg for pneumonia, mass, pleural effusion, pneumothorax, has normal mediastinum [AR]      ED Course User Index  [AR] Louisa García MD                                           Medical Decision Making  Differential includes, but not limited to bacterial bronchitis, viral bronchitis, viral pneumonia, bacterial pneumonia, viral sinusitis, bacterial sinusitis, RSV, Strep, COVID, RSV, Pertussis, asthma, COPD, croup     Get swabs and due to the changes on auscultation she agreed to chest x-ray.  Give her the Profen and Zofran while she is here and reevaluate.    Your chest xray did not show an infection. Your swabs for COVID and flu were negative. You likely have a sinus infection and you were given your first dose of antibiotic here along with zofran for nausea. I sent prescriptions for both to your pharmacy. Start the antibiotic this evening.    She understood and agreed    Problems Addressed:  Acute non-recurrent maxillary sinusitis: complicated acute illness or injury    Amount and/or Complexity of Data  Reviewed  Labs: ordered. Decision-making details documented in ED Course.  Radiology: ordered and independent interpretation performed. Decision-making details documented in ED Course.    Risk  Prescription drug management.        Final diagnoses:   Acute non-recurrent maxillary sinusitis       ED Disposition  ED Disposition       ED Disposition   Discharge    Condition   Stable    Comment   --               Dee Keene, APRN  [address] [place] [address]  [phone]    In 1 week  As needed         Medication List        New Prescriptions      cefdinir 300 MG capsule  Commonly known as: OMNICEF  Take 1 capsule by mouth 2 (Two) Times a Day for 7 days.     ondansetron ODT 4 MG disintegrating tablet  Commonly known as: ZOFRAN-ODT  Place 1 tablet on the tongue Every 6 (Six) Hours As Needed for Nausea for up to 10 doses.               Where to Get Your Medications        These medications were sent to [facility] - [place], [place] - [address] - [phone]  - [phone] FX  [address] [place] [address]      Phone: [phone]   cefdinir 300 MG capsule  ondansetron ODT 4 MG disintegrating tablet

## 2025-07-13 NOTE — DISCHARGE INSTRUCTIONS
Your chest xray did not show an infection. Your swabs for COVID and flu were negative. You likely have a sinus infection and you were given your first dose of antibiotic here along with zofran for nausea. I sent prescriptions for both to your pharmacy. Start the antibiotic this evening.

## 2025-07-13 NOTE — Clinical Note
[facility]  [address]  [place] [address]    Terri Aguilar was seen and treated in our emergency department on 7/13/2025.  She may return to work on 07/16/2025.         Thank you for choosing [facility].    Louisa García MD

## 2025-07-27 ENCOUNTER — HOSPITAL ENCOUNTER (EMERGENCY)
Facility: HOSPITAL | Age: 40
Discharge: HOME | End: 2025-07-27
Attending: EMERGENCY MEDICINE | Admitting: EMERGENCY MEDICINE
Payer: MEDICAID

## 2025-07-27 VITALS
BODY MASS INDEX: 35.92 KG/M2 | DIASTOLIC BLOOD PRESSURE: 91 MMHG | HEIGHT: 67 IN | HEART RATE: 78 BPM | WEIGHT: 228.84 LBS | SYSTOLIC BLOOD PRESSURE: 153 MMHG | OXYGEN SATURATION: 98 % | RESPIRATION RATE: 18 BRPM | TEMPERATURE: 98 F

## 2025-07-27 DIAGNOSIS — R09.81 NASAL CONGESTION: ICD-10-CM

## 2025-07-27 DIAGNOSIS — I10 HYPERTENSION, UNSPECIFIED TYPE: ICD-10-CM

## 2025-07-27 DIAGNOSIS — N39.0 ACUTE UTI: Primary | ICD-10-CM

## 2025-07-27 LAB
BACTERIA UR QL AUTO: ABNORMAL /HPF
BILIRUB UR QL STRIP: NEGATIVE
CLARITY UR: ABNORMAL
COLOR UR: YELLOW
GLUCOSE UR STRIP-MCNC: NEGATIVE MG/DL
HGB UR QL STRIP.AUTO: ABNORMAL
HYALINE CASTS UR QL AUTO: ABNORMAL /LPF
KETONES UR QL STRIP: ABNORMAL
LEUKOCYTE ESTERASE UR QL STRIP.AUTO: ABNORMAL
NITRITE UR QL STRIP: POSITIVE
PH UR STRIP.AUTO: 5.5 [PH] (ref 5–8)
PROT UR QL STRIP: ABNORMAL
RBC # UR STRIP: ABNORMAL /HPF
REF LAB TEST METHOD: ABNORMAL
SP GR UR STRIP: 1.01 (ref 1–1.03)
SQUAMOUS #/AREA URNS HPF: ABNORMAL /HPF
UROBILINOGEN UR QL STRIP: ABNORMAL
WBC # UR STRIP: ABNORMAL /HPF

## 2025-07-27 PROCEDURE — 25010000002 CEFTRIAXONE PER 250 MG

## 2025-07-27 PROCEDURE — 87086 URINE CULTURE/COLONY COUNT: CPT

## 2025-07-27 PROCEDURE — 87077 CULTURE AEROBIC IDENTIFY: CPT

## 2025-07-27 PROCEDURE — 96372 THER/PROPH/DIAG INJ SC/IM: CPT

## 2025-07-27 PROCEDURE — 99283 EMERGENCY DEPT VISIT LOW MDM: CPT

## 2025-07-27 PROCEDURE — 81001 URINALYSIS AUTO W/SCOPE: CPT | Performed by: EMERGENCY MEDICINE

## 2025-07-27 PROCEDURE — 87186 SC STD MICRODIL/AGAR DIL: CPT

## 2025-07-27 PROCEDURE — 25010000002 LIDOCAINE 1 % SOLUTION 10 ML VIAL

## 2025-07-27 RX ORDER — AMLODIPINE BESYLATE 5 MG/1
5 TABLET ORAL DAILY
Qty: 30 TABLET | Refills: 0 | Status: SHIPPED | OUTPATIENT
Start: 2025-07-27

## 2025-07-27 RX ORDER — ACETAMINOPHEN 500 MG
1000 TABLET ORAL ONCE
Status: COMPLETED | OUTPATIENT
Start: 2025-07-27 | End: 2025-07-27

## 2025-07-27 RX ORDER — FLUTICASONE PROPIONATE 50 MCG
2 SPRAY, SUSPENSION (ML) NASAL DAILY
Qty: 11.1 G | Refills: 0 | Status: SHIPPED | OUTPATIENT
Start: 2025-07-27

## 2025-07-27 RX ORDER — AMLODIPINE BESYLATE 5 MG/1
5 TABLET ORAL
Status: COMPLETED | OUTPATIENT
Start: 2025-07-27 | End: 2025-07-27

## 2025-07-27 RX ADMIN — ACETAMINOPHEN 1000 MG: 500 TABLET ORAL at 20:06

## 2025-07-27 RX ADMIN — CEFTRIAXONE SODIUM 1 G: 1 INJECTION, POWDER, FOR SOLUTION INTRAMUSCULAR; INTRAVENOUS at 20:02

## 2025-07-27 RX ADMIN — AMLODIPINE BESYLATE 5 MG: 5 TABLET ORAL at 20:06

## 2025-07-27 NOTE — ED PROVIDER NOTES
"SHARED VISIT ATTESTATION:    This visit was performed by myself and an APC.  I personally approved the management plan/medical decision making and take responsibility for the patient management.      SHARED VISIT NOTE:    Patient is 39 y.o. year old female that presents to the ED for evaluation of sinus infection and urine.     Physical Exam    ED Course:    /91 (BP Location: Left arm, Patient Position: Sitting)   Pulse 78   Temp 98 °F (36.7 °C) (Oral)   Resp 18   Ht 170.2 cm (67\")   Wt 104 kg (228 lb 13.4 oz)   SpO2 98%   BMI 35.84 kg/m²       The following orders were placed and all results were independently analyzed by me:  Orders Placed This Encounter   Procedures    Urine Culture - Urine,    Urinalysis With Microscopic If Indicated (No Culture) - Urine, Clean Catch    Urinalysis, Microscopic Only - Urine, Clean Catch       Medications Given in the Emergency Department:  Medications   amLODIPine (NORVASC) tablet 5 mg (5 mg Oral Given 7/27/25 2006)   acetaminophen (TYLENOL) tablet 1,000 mg (1,000 mg Oral Given 7/27/25 2006)   cefTRIAXone (ROCEPHIN) 1 g in lidocaine (XYLOCAINE) 1 % IM only syringe (1 g Intramuscular Given 7/27/25 2002)        ED Course:         Labs:    Lab Results (last 24 hours)       Procedure Component Value Units Date/Time    Urinalysis With Microscopic If Indicated (No Culture) - Urine, Clean Catch [[id number]]  (Abnormal) Collected: 07/27/25 1918    Specimen: Urine, Clean Catch Updated: 07/27/25 1950     Color, UA Yellow     Appearance, UA Turbid     pH, UA 5.5     Specific Gravity, UA 1.012     Glucose, UA Negative     Ketones, UA Trace     Bilirubin, UA Negative     Blood, UA Large (3+)     Protein, UA 30 mg/dL (1+)     Leuk Esterase, UA Large (3+)     Nitrite, UA Positive     Urobilinogen, UA 0.2 E.U./dL    Urinalysis, Microscopic Only - Urine, Clean Catch [[id number]]  (Abnormal) Collected: 07/27/25 1918    Specimen: Urine, Clean Catch Updated: 07/27/25 1950     RBC, UA 6-10 " /HPF      WBC, UA Too Numerous to Count /HPF      Bacteria, UA 4+ /HPF      Squamous Epithelial Cells, UA 3-6 /HPF      Hyaline Casts, UA 3-6 /LPF      Methodology Automated Microscopy    Urine Culture - Urine, Urine, Clean Catch [[id number]] Collected: 07/27/25 1918    Specimen: Urine, Clean Catch Updated: 07/27/25 1959             Imaging:    No Radiology Exams Resulted Within Past 24 Hours    MDM:    Procedures    Labs were collected in the emergency department and all labs were reviewed and interpreted by me.                     Morris Chavez MD  22:12 EDT  07/27/25         Morris Chavez MD  07/27/25 1371

## 2025-07-27 NOTE — Clinical Note
AMRIT FERNANDEZ  [facility] EMERGENCY ROOM  [address]  [place] [address]  Phone: [phone]  Fax: [phone]    Terri Fernandez was seen and treated in our emergency department on 7/27/2025.  She may return to work on 07/29/2025.         Thank you for choosing [facility].    Morris Chavez MD

## 2025-07-27 NOTE — ED PROVIDER NOTES
Time: 7:35 PM EDT  Date of encounter:  7/27/2025  Independent Historian/Clinical History and Information was obtained by:   Patient    History is limited by: N/A    Chief Complaint: Dysuria      History of Present Illness:  Patient is a 39 y.o. year old female who presents to the emergency department for evaluation of dysuria.  Patient presents the emergency department today regarding dysuria.  She states this started today.  She does state to start her menstrual cycle today but she is having a lot of pressure with urination.  Is denying any hematuria.  Has some associated pelvic pain.  Patient also has secondary complaint of nasal congestion that has been ongoing for a few weeks.  She states she was treated for sinus infection but did not feel that it helped her symptoms.  She has been trying to utilize over-the-counter nasal sprays without improvement.  States she has been on allergy medications but she quit taking them.  Patient denies headache.  Patient states that she used to be on medications for blood pressure but they only prescribed her a certain amount and she never had a refill because it regulated her blood pressure.  Was not instructed by PCP to stop taking blood pressure medications.      Patient Care Team  Primary Care Provider: Dee Keene APRN    Past Medical History:     No Known Allergies  Past Medical History:   Diagnosis Date    Anxiety and depression     Herpes     Labial cyst      Past Surgical History:   Procedure Laterality Date    CYST REMOVAL      cyst removal from neck    TUBAL ABDOMINAL LIGATION       Family History   Problem Relation Age of Onset    Bone cancer Maternal Grandfather     Breast cancer Neg Hx     Ovarian cancer Neg Hx     Uterine cancer Neg Hx     Colon cancer Neg Hx     Malig Hyperthermia Neg Hx        Home Medications:  Prior to Admission medications    Medication Sig Start Date End Date Taking? Authorizing Provider   FLUoxetine (PROzac) 10 MG capsule Take 1  "capsule by mouth Every Morning. 12/11/24   Roopa Enriquez MD   fluticasone (FLONASE) 50 MCG/ACT nasal spray 2 sprays by Each Nare route Daily. 10/10/24   Roopa Enriquez MD   ibuprofen (ADVIL,MOTRIN) 800 MG tablet Take 1 tablet by mouth Every 6 (Six) Hours As Needed for Moderate Pain. 5/22/25   Caryl Lacy APRN   methocarbamol (ROBAXIN) 750 MG tablet Take 1 tablet by mouth 3 (Three) Times a Day As Needed for Muscle Spasms. 5/22/25   Caryl Lacy APRN   ondansetron ODT (ZOFRAN-ODT) 4 MG disintegrating tablet Place 1 tablet on the tongue Every 6 (Six) Hours As Needed for Nausea for up to 10 doses. 7/13/25   Louisa García MD   valACYclovir (VALTREX) 500 MG tablet Take 1 tablet by mouth Every 12 (Twelve) Hours. 12/11/24   Roopa Enriquez MD        Social History:   Social History     Tobacco Use    Smoking status: Every Day     Current packs/day: 0.50     Average packs/day: 0.5 packs/day for 15.0 years (7.5 ttl pk-yrs)     Types: Cigarettes     Passive exposure: Current    Smokeless tobacco: Never    Tobacco comments:     LAST SMOKED 4/27/25   Vaping Use    Vaping status: Former   Substance Use Topics    Alcohol use: Yes     Comment: OCC    Drug use: Never         Review of Systems:  Review of Systems   Constitutional:  Negative for fever.   HENT:  Positive for congestion. Negative for facial swelling and sore throat.    Genitourinary:  Positive for dysuria and pelvic pain. Negative for flank pain and hematuria.   All other systems reviewed and are negative.       Physical Exam:  /91 (BP Location: Left arm, Patient Position: Sitting)   Pulse 78   Temp 98 °F (36.7 °C) (Oral)   Resp 18   Ht 170.2 cm (67\")   Wt 104 kg (228 lb 13.4 oz)   SpO2 98%   BMI 35.84 kg/m²     Physical Exam  Vitals and nursing note reviewed.   Constitutional:       General: She is not in acute distress.     Appearance: Normal appearance. She is not ill-appearing, toxic-appearing or diaphoretic.   HENT:     "  Head: Normocephalic and atraumatic.      Nose: Congestion present. No nasal deformity, septal deviation, signs of injury, laceration, nasal tenderness, mucosal edema or rhinorrhea.      Right Nostril: No foreign body, epistaxis, septal hematoma or occlusion.      Left Nostril: No foreign body, epistaxis, septal hematoma or occlusion.      Right Sinus: Maxillary sinus tenderness present. No frontal sinus tenderness.      Left Sinus: Maxillary sinus tenderness present. No frontal sinus tenderness.   Eyes:      Extraocular Movements: Extraocular movements intact.      Conjunctiva/sclera: Conjunctivae normal.      Pupils: Pupils are equal, round, and reactive to light.   Cardiovascular:      Rate and Rhythm: Normal rate and regular rhythm.      Heart sounds: Normal heart sounds.   Pulmonary:      Effort: Pulmonary effort is normal.      Breath sounds: Normal breath sounds.   Abdominal:      General: Abdomen is flat. Bowel sounds are normal. There is no distension.      Palpations: Abdomen is soft. There is no mass.      Tenderness: There is abdominal tenderness (Suprapubic). There is no guarding or rebound.      Hernia: No hernia is present.   Musculoskeletal:         General: Normal range of motion.      Cervical back: Normal range of motion and neck supple.   Skin:     General: Skin is warm and dry.   Neurological:      General: No focal deficit present.      Mental Status: She is alert and oriented to person, place, and time.   Psychiatric:         Mood and Affect: Mood normal.         Behavior: Behavior normal.         Thought Content: Thought content normal.         Judgment: Judgment normal.                  Medical Decision Making:    Comorbidities that affect care:    Anxiety, depression    External Notes reviewed:    Previous Clinic Note: Urgent care visit from 7- or patient was treated for sinusitis      The following orders were placed and all results were independently analyzed by me:  Orders Placed  This Encounter   Procedures    Urine Culture - Urine,    Urinalysis With Microscopic If Indicated (No Culture) - Urine, Clean Catch    Urinalysis, Microscopic Only - Urine, Clean Catch       Medications Given in the Emergency Department:  Medications   amLODIPine (NORVASC) tablet 5 mg (5 mg Oral Given 7/27/25 2006)   acetaminophen (TYLENOL) tablet 1,000 mg (1,000 mg Oral Given 7/27/25 2006)   cefTRIAXone (ROCEPHIN) 1 g in lidocaine (XYLOCAINE) 1 % IM only syringe (1 g Intramuscular Given 7/27/25 2002)        ED Course:         Labs:    Lab Results (last 24 hours)       Procedure Component Value Units Date/Time    Urinalysis With Microscopic If Indicated (No Culture) - Urine, Clean Catch [[id number]]  (Abnormal) Collected: 07/27/25 1918    Specimen: Urine, Clean Catch Updated: 07/27/25 1950     Color, UA Yellow     Appearance, UA Turbid     pH, UA 5.5     Specific Gravity, UA 1.012     Glucose, UA Negative     Ketones, UA Trace     Bilirubin, UA Negative     Blood, UA Large (3+)     Protein, UA 30 mg/dL (1+)     Leuk Esterase, UA Large (3+)     Nitrite, UA Positive     Urobilinogen, UA 0.2 E.U./dL    Urinalysis, Microscopic Only - Urine, Clean Catch [[id number]]  (Abnormal) Collected: 07/27/25 1918    Specimen: Urine, Clean Catch Updated: 07/27/25 1950     RBC, UA 6-10 /HPF      WBC, UA Too Numerous to Count /HPF      Bacteria, UA 4+ /HPF      Squamous Epithelial Cells, UA 3-6 /HPF      Hyaline Casts, UA 3-6 /LPF      Methodology Automated Microscopy    Urine Culture - Urine, Urine, Clean Catch [[id number]] Collected: 07/27/25 1918    Specimen: Urine, Clean Catch Updated: 07/27/25 1959             Imaging:    No Radiology Exams Resulted Within Past 24 Hours      Differential Diagnosis and Discussion:    Dysuria: Differential diagnosis includes but is not limited to urethritis, cystitis, pyelonephritis, ureteral calculi, neoplasm, chemical irritant, urethral stricture, and trauma    PROCEDURES:    Labs were collected in  the emergency department and all labs were reviewed and interpreted by me.    No orders to display       Procedures    MDM  Number of Diagnoses or Management Options  Acute UTI  Hypertension, unspecified type  Nasal congestion  Diagnosis management comments: Patient presented to the emergency department today for evaluation of dysuria and concern for sinus infection because she has nasal congestion that is persistent.  Urinalysis positive for acute TIS nitrate positive leukocyte positive.  Will culture patient's urine.  She was given 1 g Rocephin in the emergency department I will discharge home with Augmentin.  She does have maxillary sinus tenderness on physical exam with some nasal congestion.  Augmentin being prescribed for UTI will also cover sinus infection.  Will refill her Flonase.  Also started patient on Norvasc.  She is hypertensive in the emergency department today and hypertensive at her last visit in urgent care.  Patient used to be on blood pressure medication but quit taking it at her own discretion, unsure what the medication was.  Patient had improvement of blood pressure in the emergency department to 153/91.  Informed patient to monitor blood pressure at least twice daily at home and keep a log and follow-up with PCP regarding this.  Return guidelines given.       Amount and/or Complexity of Data Reviewed  Clinical lab tests: ordered and reviewed  Decide to obtain previous medical records or to obtain history from someone other than the patient: yes    Risk of Complications, Morbidity, and/or Mortality  Presenting problems: moderate  Diagnostic procedures: low  Management options: low    Patient Progress  Patient progress: stable       Patient Care Considerations:    SEPSIS was considered but is NOT present in the emergency department as SIRS criteria is not present.      Consultants/Shared Management Plan:    SHARED VISIT: I have discussed the case with my supervising physician, Dr. Chavez who  states agrees with workup and treatment. The substantive portion of the medical decision was made by the attesting physician who made or approve the management plan and will take responsibility for the patient.  Clinical findings were discussed and ultimate disposition was made in consult with supervising physician.    Social Determinants of Health:    Patient is independent, reliable, and has access to care.       Disposition and Care Coordination:    Discharged: The patient is suitable and stable for discharge with no need for consideration of admission.    I have explained the patient´s condition, diagnoses and treatment plan based on the information available to me at this time. I have answered questions and addressed any concerns. The patient has a good  understanding of the patient´s diagnosis, condition, and treatment plan as can be expected at this point. The vital signs have been stable. The patient´s condition is stable and appropriate for discharge from the emergency department.      The patient will pursue further outpatient evaluation with the primary care physician or other designated or consulting physician as outlined in the discharge instructions. They are agreeable to this plan of care and follow-up instructions have been explained in detail. The patient has received these instructions in written format and has expressed an understanding of the discharge instructions. The patient is aware that any significant change in condition or worsening of symptoms should prompt an immediate return to this or the closest emergency department or call to 911.  I have explained discharge medications and the need for follow up with the patient/caretakers. This was also printed in the discharge instructions. Patient was discharged with the following medications and follow up:      Medication List        New Prescriptions      amLODIPine 5 MG tablet  Commonly known as: NORVASC  Take 1 tablet by mouth Daily.      amoxicillin-clavulanate 875-125 MG per tablet  Commonly known as: AUGMENTIN  Take 1 tablet by mouth 2 (Two) Times a Day for 7 days.            Changed      * fluticasone 50 MCG/ACT nasal spray  Commonly known as: FLONASE  What changed: Another medication with the same name was added. Make sure you understand how and when to take each.     * fluticasone 50 MCG/ACT nasal spray  Commonly known as: FLONASE  Administer 2 sprays into the nostril(s) as directed by provider Daily.  What changed: You were already taking a medication with the same name, and this prescription was added. Make sure you understand how and when to take each.           * This list has 2 medication(s) that are the same as other medications prescribed for you. Read the directions carefully, and ask your doctor or other care provider to review them with you.                   Where to Get Your Medications        These medications were sent to [facility]/pharmacy #[id number] - [place], [place] - [address] - [phone] [facility] [phone] FX  [address] [place] [address]      Hours: 24-hours Phone: [phone]   amLODIPine 5 MG tablet  amoxicillin-clavulanate 875-125 MG per tablet  fluticasone 50 MCG/ACT nasal spray      Dee Keene, DARYL  [address] [place] [id number] [phone]             Final diagnoses:   Acute UTI   Hypertension, unspecified type   Nasal congestion        ED Disposition       ED Disposition   Discharge    Condition   Stable    Comment   --               This medical record created using voice recognition software.             Jayden Salas PA-C  07/27/25 2036

## 2025-07-28 NOTE — DISCHARGE INSTRUCTIONS
Take full course of antibiotics for treatment of your urinary tract infection.  Utilize the nasal spray daily as prescribed.  Take blood pressure medication daily as prescribed.  I would recommend checking her blood pressure twice daily and keeping a document of this and follow-up with your primary care provider in 1 week for reevaluation regarding your blood pressure.

## 2025-07-29 LAB — BACTERIA SPEC AEROBE CULT: ABNORMAL

## 2025-08-02 ENCOUNTER — HOSPITAL ENCOUNTER (EMERGENCY)
Facility: HOSPITAL | Age: 40
Discharge: HOME | End: 2025-08-02
Attending: EMERGENCY MEDICINE
Payer: MEDICAID

## 2025-08-02 ENCOUNTER — APPOINTMENT (OUTPATIENT)
Dept: CT IMAGING | Facility: HOSPITAL | Age: 40
End: 2025-08-02
Payer: MEDICAID

## 2025-08-02 ENCOUNTER — APPOINTMENT (OUTPATIENT)
Dept: GENERAL RADIOLOGY | Facility: HOSPITAL | Age: 40
End: 2025-08-02
Payer: MEDICAID

## 2025-08-02 VITALS
TEMPERATURE: 98 F | DIASTOLIC BLOOD PRESSURE: 97 MMHG | WEIGHT: 228 LBS | RESPIRATION RATE: 16 BRPM | HEART RATE: 77 BPM | BODY MASS INDEX: 35.79 KG/M2 | HEIGHT: 67 IN | SYSTOLIC BLOOD PRESSURE: 148 MMHG | OXYGEN SATURATION: 98 %

## 2025-08-02 DIAGNOSIS — H65.193 ACUTE MEE (MIDDLE EAR EFFUSION), BILATERAL: ICD-10-CM

## 2025-08-02 DIAGNOSIS — G43.819 OTHER MIGRAINE WITHOUT STATUS MIGRAINOSUS, INTRACTABLE: Primary | ICD-10-CM

## 2025-08-02 DIAGNOSIS — R05.1 ACUTE COUGH: ICD-10-CM

## 2025-08-02 DIAGNOSIS — R52 GENERALIZED BODY ACHES: ICD-10-CM

## 2025-08-02 LAB
ALBUMIN SERPL-MCNC: 3.6 G/DL (ref 3.5–5.2)
ALBUMIN/GLOB SERPL: 1.5 G/DL
ALP SERPL-CCNC: 74 U/L (ref 39–117)
ALT SERPL W P-5'-P-CCNC: 15 U/L (ref 1–33)
AMPHET+METHAMPHET UR QL: NEGATIVE
AMPHETAMINES UR QL: NEGATIVE
ANION GAP SERPL CALCULATED.3IONS-SCNC: 6.2 MMOL/L (ref 5–15)
AST SERPL-CCNC: 19 U/L (ref 1–32)
BACTERIA UR QL AUTO: ABNORMAL /HPF
BARBITURATES UR QL SCN: NEGATIVE
BASOPHILS # BLD AUTO: 0.02 10*3/MM3 (ref 0–0.2)
BASOPHILS NFR BLD AUTO: 0.4 % (ref 0–1.5)
BENZODIAZ UR QL SCN: NEGATIVE
BILIRUB SERPL-MCNC: 0.4 MG/DL (ref 0–1.2)
BILIRUB UR QL STRIP: NEGATIVE
BUN SERPL-MCNC: 9.3 MG/DL (ref 6–20)
BUN/CREAT SERPL: 15.5 (ref 7–25)
BUPRENORPHINE SERPL-MCNC: NEGATIVE NG/ML
CALCIUM SPEC-SCNC: 9.1 MG/DL (ref 8.6–10.5)
CANNABINOIDS SERPL QL: POSITIVE
CHLORIDE SERPL-SCNC: 107 MMOL/L (ref 98–107)
CLARITY UR: CLEAR
CO2 SERPL-SCNC: 26.8 MMOL/L (ref 22–29)
COCAINE UR QL: NEGATIVE
COLOR UR: YELLOW
CREAT SERPL-MCNC: 0.6 MG/DL (ref 0.57–1)
DEPRECATED RDW RBC AUTO: 53.4 FL (ref 37–54)
EGFRCR SERPLBLD CKD-EPI 2021: 116.5 ML/MIN/1.73
EOSINOPHIL # BLD AUTO: 0.1 10*3/MM3 (ref 0–0.4)
EOSINOPHIL NFR BLD AUTO: 2.1 % (ref 0.3–6.2)
ERYTHROCYTE [DISTWIDTH] IN BLOOD BY AUTOMATED COUNT: 13.6 % (ref 12.3–15.4)
FLUAV SUBTYP SPEC NAA+PROBE: NOT DETECTED
FLUBV RNA NPH QL NAA+NON-PROBE: NOT DETECTED
GLOBULIN UR ELPH-MCNC: 2.4 GM/DL
GLUCOSE SERPL-MCNC: 93 MG/DL (ref 65–99)
GLUCOSE UR STRIP-MCNC: NEGATIVE MG/DL
HCT VFR BLD AUTO: 37.9 % (ref 34–46.6)
HGB BLD-MCNC: 13.1 G/DL (ref 12–15.9)
HGB UR QL STRIP.AUTO: ABNORMAL
HOLD SPECIMEN: NORMAL
HYALINE CASTS UR QL AUTO: ABNORMAL /LPF
IMM GRANULOCYTES # BLD AUTO: 0.01 10*3/MM3 (ref 0–0.05)
IMM GRANULOCYTES NFR BLD AUTO: 0.2 % (ref 0–0.5)
KETONES UR QL STRIP: NEGATIVE
LEUKOCYTE ESTERASE UR QL STRIP.AUTO: NEGATIVE
LYMPHOCYTES # BLD AUTO: 2.03 10*3/MM3 (ref 0.7–3.1)
LYMPHOCYTES NFR BLD AUTO: 42.5 % (ref 19.6–45.3)
MAGNESIUM SERPL-MCNC: 1.8 MG/DL (ref 1.6–2.6)
MCH RBC QN AUTO: 36.7 PG (ref 26.6–33)
MCHC RBC AUTO-ENTMCNC: 34.6 G/DL (ref 31.5–35.7)
MCV RBC AUTO: 106.2 FL (ref 79–97)
METHADONE UR QL SCN: NEGATIVE
MONOCYTES # BLD AUTO: 0.4 10*3/MM3 (ref 0.1–0.9)
MONOCYTES NFR BLD AUTO: 8.4 % (ref 5–12)
NEUTROPHILS NFR BLD AUTO: 2.22 10*3/MM3 (ref 1.7–7)
NEUTROPHILS NFR BLD AUTO: 46.4 % (ref 42.7–76)
NITRITE UR QL STRIP: NEGATIVE
OPIATES UR QL: NEGATIVE
OXYCODONE UR QL SCN: NEGATIVE
PCP UR QL SCN: NEGATIVE
PH UR STRIP.AUTO: 8.5 [PH] (ref 5–8)
PLATELET # BLD AUTO: 298 10*3/MM3 (ref 140–450)
PMV BLD AUTO: 8.8 FL (ref 6–12)
POTASSIUM SERPL-SCNC: 3.6 MMOL/L (ref 3.5–5.2)
PROT SERPL-MCNC: 6 G/DL (ref 6–8.5)
PROT UR QL STRIP: NEGATIVE
RBC # BLD AUTO: 3.57 10*6/MM3 (ref 3.77–5.28)
RBC # UR STRIP: ABNORMAL /HPF
REF LAB TEST METHOD: ABNORMAL
SARS-COV-2 RNA RESP QL NAA+PROBE: NOT DETECTED
SODIUM SERPL-SCNC: 140 MMOL/L (ref 136–145)
SP GR UR STRIP: 1.01 (ref 1–1.03)
SQUAMOUS #/AREA URNS HPF: ABNORMAL /HPF
TRICYCLICS UR QL SCN: NEGATIVE
TSH SERPL DL<=0.05 MIU/L-ACNC: 0.92 UIU/ML (ref 0.27–4.2)
UROBILINOGEN UR QL STRIP: ABNORMAL
WBC # UR STRIP: ABNORMAL /HPF
WBC NRBC COR # BLD AUTO: 4.78 10*3/MM3 (ref 3.4–10.8)

## 2025-08-02 PROCEDURE — 87636 SARSCOV2 & INF A&B AMP PRB: CPT

## 2025-08-02 PROCEDURE — 25810000003 SODIUM CHLORIDE 0.9 % SOLUTION

## 2025-08-02 PROCEDURE — 93005 ELECTROCARDIOGRAM TRACING: CPT

## 2025-08-02 PROCEDURE — 80053 COMPREHEN METABOLIC PANEL: CPT

## 2025-08-02 PROCEDURE — 70450 CT HEAD/BRAIN W/O DYE: CPT

## 2025-08-02 PROCEDURE — 71046 X-RAY EXAM CHEST 2 VIEWS: CPT

## 2025-08-02 PROCEDURE — 80306 DRUG TEST PRSMV INSTRMNT: CPT

## 2025-08-02 PROCEDURE — 99284 EMERGENCY DEPT VISIT MOD MDM: CPT | Performed by: EMERGENCY MEDICINE

## 2025-08-02 PROCEDURE — 85025 COMPLETE CBC W/AUTO DIFF WBC: CPT

## 2025-08-02 PROCEDURE — 84443 ASSAY THYROID STIM HORMONE: CPT

## 2025-08-02 PROCEDURE — 81001 URINALYSIS AUTO W/SCOPE: CPT

## 2025-08-02 PROCEDURE — 25010000002 KETOROLAC TROMETHAMINE PER 15 MG

## 2025-08-02 PROCEDURE — 96361 HYDRATE IV INFUSION ADD-ON: CPT

## 2025-08-02 PROCEDURE — 96374 THER/PROPH/DIAG INJ IV PUSH: CPT

## 2025-08-02 PROCEDURE — 83735 ASSAY OF MAGNESIUM: CPT

## 2025-08-02 RX ORDER — KETOROLAC TROMETHAMINE 15 MG/ML
15 INJECTION, SOLUTION INTRAMUSCULAR; INTRAVENOUS ONCE
Status: COMPLETED | OUTPATIENT
Start: 2025-08-02 | End: 2025-08-02

## 2025-08-02 RX ORDER — SODIUM CHLORIDE 0.9 % (FLUSH) 0.9 %
10 SYRINGE (ML) INJECTION AS NEEDED
Status: DISCONTINUED | OUTPATIENT
Start: 2025-08-02 | End: 2025-08-02 | Stop reason: HOSPADM

## 2025-08-02 RX ORDER — KETOROLAC TROMETHAMINE 10 MG/1
10 TABLET, FILM COATED ORAL EVERY 6 HOURS PRN
Qty: 20 TABLET | Refills: 0 | Status: SHIPPED | OUTPATIENT
Start: 2025-08-02 | End: 2025-08-07

## 2025-08-02 RX ORDER — CHLORCYCLIZINE HYDROCHLORIDE AND PSEUDOEPHEDRINE HYDROCHLORIDE 25; 60 MG/1; MG/1
1 TABLET ORAL EVERY 8 HOURS PRN
Qty: 30 TABLET | Refills: 0 | Status: SHIPPED | OUTPATIENT
Start: 2025-08-02 | End: 2025-08-07

## 2025-08-02 RX ADMIN — KETOROLAC TROMETHAMINE 15 MG: 15 INJECTION INTRAMUSCULAR at 12:55

## 2025-08-02 RX ADMIN — SODIUM CHLORIDE 1000 ML: 9 INJECTION, SOLUTION INTRAVENOUS at 12:55

## 2025-08-02 NOTE — DISCHARGE INSTRUCTIONS
Your headache has resolved while you were evaluated in the ED  Your chest x-ray is unremarkable for any acute finding however you are currently on Augmentin please continue taking  Stahist is sent to the pharmacy to help with your ear effusion, and decrease the pressure from behind your eardrum  Toradol is sent to the pharmacy to help with your migraine headaches however you cannot combine it with Robaxin  Please follow-up with your PCP for reevaluation  Return to the ED if worsening of symptoms

## 2025-08-02 NOTE — FS[facility] PROVIDER NOTE
"CHIEF CONCERN   Chief Complaint   Patient presents with   • Fatigue   • Weakness - Generalized     Fatigue and dizziness since last week, stated that her blood pressure was causing her to feel bad        Subjective     History of Present Illness  This is a female with a history of high blood pressure presenting with fatigue, headache, cough, and urinary tract infection.    The patient reports feeling sluggish and experiencing visual disturbances, describing them as seeing \"black smoke.\" Despite adequate rest, these symptoms persist. She sought medical attention last week due to these symptoms and was diagnosed with high blood pressure. She was prescribed medication, the name of which she does not recall. Her condition worsened yesterday and continues to deteriorate today. She has not been in contact with any sick individuals recently. She reports no chest pain or shortness of breath. The patient smokes half a pack of cigarettes daily. She has been feeling warm but reports no fever or chills. She reports no issues with urination. She has been experiencing intermittent diarrhea and vomited this morning. Her menstrual period started on Sunday and ended today.    The patient has been experiencing a headache, which has slightly improved but persists. She took Tylenol for the pain, which provided some relief. She has had migraines on three previous occasions and believes this could be another episode.    The patient has been coughing for the past 3 weeks, accompanied by nasal drainage.    She had a urinary tract infection last week and is currently on amoxicillin for it.    SOCIAL HISTORY  She smokes half a pack a day.      History of Present Illness    Review of Systems    Past Medical History:   Diagnosis Date   • Anxiety and depression    • Herpes    • Labial cyst        Past Surgical History:   Procedure Laterality Date   • CYST REMOVAL      cyst removal from neck   • TUBAL ABDOMINAL LIGATION         Family History "   Problem Relation Age of Onset   • Bone cancer Maternal Grandfather    • Breast cancer Neg Hx    • Ovarian cancer Neg Hx    • Uterine cancer Neg Hx    • Colon cancer Neg Hx    • Malig Hyperthermia Neg Hx        Social History     Socioeconomic History   • Marital status:    Tobacco Use   • Smoking status: Every Day     Current packs/day: 0.50     Average packs/day: 0.5 packs/day for 15.0 years (7.5 ttl pk-yrs)     Types: Cigarettes     Passive exposure: Current   • Smokeless tobacco: Never   • Tobacco comments:     LAST SMOKED 4/27/25   Vaping Use   • Vaping status: Former   Substance and Sexual Activity   • Alcohol use: Yes     Comment: OCC   • Drug use: Never   • Sexual activity: Defer     Birth control/protection: None       No Known Allergies    No current facility-administered medications on file prior to encounter.     Current Outpatient Medications on File Prior to Encounter   Medication Sig Dispense Refill   • amLODIPine (NORVASC) 5 MG tablet Take 1 tablet by mouth Daily. 30 tablet 0   • amoxicillin-clavulanate (AUGMENTIN) 875-125 MG per tablet Take 1 tablet by mouth 2 (Two) Times a Day for 7 days. 14 tablet 0   • FLUoxetine (PROzac) 10 MG capsule Take 1 capsule by mouth Every Morning.     • fluticasone (FLONASE) 50 MCG/ACT nasal spray 2 sprays by Each Nare route Daily.     • fluticasone (FLONASE) 50 MCG/ACT nasal spray Administer 2 sprays into the nostril(s) as directed by provider Daily. 11.1 g 0   • ibuprofen (ADVIL,MOTRIN) 800 MG tablet Take 1 tablet by mouth Every 6 (Six) Hours As Needed for Moderate Pain. 30 tablet 0   • methocarbamol (ROBAXIN) 750 MG tablet Take 1 tablet by mouth 3 (Three) Times a Day As Needed for Muscle Spasms. 20 tablet 0   • ondansetron ODT (ZOFRAN-ODT) 4 MG disintegrating tablet Place 1 tablet on the tongue Every 6 (Six) Hours As Needed for Nausea for up to 10 doses. 10 tablet 0   • valACYclovir (VALTREX) 500 MG tablet Take 1 tablet by mouth Every 12 (Twelve) Hours.    "      /100   Pulse 76   Temp 98 °F (36.7 °C)   Resp 16   Ht 170.2 cm (67\")   Wt 103 kg (228 lb)   SpO2 99%   BMI 35.71 kg/m²     Objective     Physical Exam  Physical Exam  ENT: Mucus is present behind the eardrum.  Neurological: Patient able to hold arms straight against resistance, raise feet one at a time, touch hand then nose, and run foot down shin bilaterally.    Procedures         ED Course       Lab Results (last 24 hours)       ** No results found for the last 24 hours. **            No Radiology Exams Resulted Within Past 24 Hours       Results      Medical Decision Making    There are no diagnoses linked to this encounter.  Assessment & Plan  Initial Assessment:  Reports feeling sluggish and seeing black smoke in vision. Symptoms started last week, noted elevated blood pressure, prescribed medication. Despite adequate rest, symptoms persisted and worsened.    Differential Diagnosis:  - Migraine: History of migraines, headache similar to previous migraines, Tylenol provided some relief.  - Hypertension: Elevated blood pressure noted last week, prescribed medication.  - Urinary tract infection: History of UTI last week, prescribed amoxicillin, still taking medication.  - Upper respiratory infection: Cough for 3 weeks, nasal drainage.    ED Course:  Basic labs conducted.  Urine sample collected.    Final Assessment:  Symptoms of fatigue, headache, cough, and history of UTI. Basic labs and urine sample collected for further investigation.    Clinical Impression:  - Fatigue  - Headache  - Cough  - Urinary tract infection    Disposition:  Discharge: Home, symptoms managed, return if symptoms worsen or new symptoms develop.    Follow-Up:  Primary care physician for follow-up on blood pressure and fatigue.  Referral to neurology for migraine management.    Patient Education:  Discussed importance of medication adherence for hypertension and UTI. Advised rest and hydration. Educated on signs of " worsening symptoms and when to seek further medical attention.    Final diagnoses:   None           No orders of the defined types were placed in this encounter.        {VIELKA [employer] Provider Statement:72052}  Dinesh Hull, DARYL 8/2/2025 11:03 EDT    FOR FULL DISCHARGE INSTRUCTIONS/COMMENTS/HANDOUTS please see the AVS    11:53  THC Screen, Urine Positive   Phencyclidine (PCP), Urine Negative   Cocaine Screen, Urine Negative   Methamphetamine, Ur Negative   Opiate Screen Negative   Amphetamine, Urine Qual Negative   Benzodiazepine Screen, Urine Negative   Tricyclic Antidepressants Screen Negative   Methadone Screen , Urine Negative   Barbiturates Screen, Urine Negative   Oxycodone Screen, Urine Negative   Buprenorphine, Screen, Urine Negative   Magnesium                  Final resultCollected 08/02 11:27  Magnesium 1.8   CBC & Differential    Final resultCollected 08/02 11:27  WBC 4.78   RBC 3.57   Hemoglobin 13.1   Hematocrit 37.9   .2   MCH 36.7   MCHC 34.6   RDW 13.6   RDW-SD 53.4   MPV 8.8   Platelets 298   Neutrophil Rel % 46.4   Lymphocyte Rel % 42.5   Monocyte Rel % 8.4   Eosinophil Rel % 2.1   Basophil Rel % 0.4   Immature Granulocyte Rel % 0.2   Neutrophils Absolute 2.22   Lymphocytes Absolute 2.03   Monocytes Absolute 0.40   Eosinophils Absolute 0.10   Basophils Absolute 0.02   Immature Grans, Absolute 0.01        CT Head Without Contrast  Result Date: 8/2/2025  No acute intracranial process.  This report was finalized on 8/2/2025 12:19 PM by Dr. Jass Suarez M.D on Workstation: [id number]               Medical Decision Making  nitial Assessment:  Patient is non toxic appearing, reports feeling sluggish and seeing black smoke in vision. Symptoms started last week, noted elevated blood pressure, prescribed medication by her pcp. Despite adequate rest, symptoms persisted and worsened. Patient reports having hx of migraine headache but is concerned about the elevated blood pressure and headache . Denies any numbness or tingling. No weakness of her extremities. Other concern is the productive cough that has been ongoing for several weeks.     Differential Diagnosis:  - Migraine: History of migraines, headache similar to previous migraines, Tylenol provided some relief.  - Hypertension: Elevated blood pressure noted  last week, prescribed medication.  - Urinary tract infection: History of UTI last week, prescribed amoxicillin, still taking medication.  - Upper respiratory infection: Cough for 3 weeks, nasal drainage.    ED Course:  Basic labs conducted.  Urine sample collected.    Final Assessment:  Symptoms of fatigue, headache, cough, and history of UTI. Basic labs and urine sample collected for further investigation.    Clinical Impression:  - Fatigue  - Headache  - Cough  - Urinary tract infection    Disposition:  Discharge: Home, symptoms managed, return if symptoms worsen or new symptoms develop.    Follow-Up:  Primary care physician for follow-up on blood pressure and fatigue.  Referral to neurology for migraine management.    Patient Education:  Discussed importance of medication adherence for hypertension and UTI. Advised rest and hydration. Educated on signs of worsening symptoms and when to seek further medical attention.        Problems Addressed:  Acute cough: complicated acute illness or injury  Acute MADI (middle ear effusion), bilateral: complicated acute illness or injury  Generalized body aches: complicated acute illness or injury  Other migraine without status migrainosus, intractable: complicated acute illness or injury    Amount and/or Complexity of Data Reviewed  Labs: ordered.  Radiology: ordered.    Risk  OTC drugs.  Prescription drug management.        Diagnoses and all orders for this visit:    1. Other migraine without status migrainosus, intractable (Primary)    2. Generalized body aches    3. Acute cough    4. Acute MADI (middle ear effusion), bilateral    Other orders  -     CT Head Without Contrast; Standing  -     CBC & Differential; Standing  -     Comprehensive Metabolic Panel; Standing  -     sodium chloride 0.9 % bolus 1,000 mL  -     TSH; Standing  -     ketorolac (TORADOL) injection 15 mg  -     Urine Drug Screen - Urine, Clean Catch; Standing  -     Cancel: Insert Peripheral IV; Standing  -      Discontinue: sodium chloride 0.9 % flush 10 mL  -     COVID-19 and FLU A/B PCR, 1 HR TAT - Swab, Nasopharynx; Standing  -     XR Chest 2 View; Standing  -     Urinalysis With Microscopic If Indicated (No Culture) - Urine, Clean Catch; Standing  -     ED Acknowledgement Form Needed;; Standing  -     CT Head Without Contrast  -     CBC & Differential  -     Comprehensive Metabolic Panel  -     TSH  -     Urine Drug Screen - Urine, Clean Catch  -     Cancel: Insert Peripheral IV  -     COVID-19 and FLU A/B PCR, 1 HR TAT - Swab, Nasopharynx  -     XR Chest 2 View  -     Urinalysis With Microscopic If Indicated (No Culture) - Urine, Clean Catch  -     ED Acknowledgement Form Needed;  -     Magnesium; Standing  -     Magnesium  -     Urinalysis, Microscopic Only - Urine, Clean Catch; Standing  -     Urinalysis, Microscopic Only - Urine, Clean Catch  -     Beatrice Urine Culture Tube -; Standing  -     Beatrice Urine Culture Tube - Urine, Clean Catch  -     Cancel: XR Chest 1 View; Standing  -     Cancel: XR Chest 1 View  -     XR additional images; Standing  -     XR additional images  -     ketorolac (TORADOL) 10 MG tablet; Take 1 tablet by mouth Every 6 (Six) Hours As Needed for Moderate Pain for up to 5 days.  Dispense: 20 tablet; Refill: 0  -     Chlorcyclizine-Pseudoephed (Stahist AD) 25-60 MG tablet; Take 1 tablet by mouth Every 8 (Eight) Hours As Needed (congestion. may substitute benadryl at night) for up to 5 days.  Dispense: 30 tablet; Refill: 0  -     ECG 12 Lead Other; dizziness; Standing  -     ECG 12 Lead Other; dizziness  -     ECG Scan; Standing  -     ECG Scan        Final diagnoses:   Other migraine without status migrainosus, intractable   Generalized body aches   Acute cough   Acute MADI (middle ear effusion), bilateral        Follow-up Information       Dee Keene, APRN. Schedule an appointment as soon as possible for a visit in 3 days.    Specialty: Nurse Practitioner  Why: As needed,  re-evaluation, If symptoms worsen  Contact information:  [address] [place] [address]  [phone]                             New Medications Ordered This Visit   Medications    sodium chloride 0.9 % bolus 1,000 mL    ketorolac (TORADOL) injection 15 mg    ketorolac (TORADOL) 10 MG tablet     Sig: Take 1 tablet by mouth Every 6 (Six) Hours As Needed for Moderate Pain for up to 5 days.     Dispense:  20 tablet     Refill:  0     How many doses of IV/IM ketorolac has the patient received? 1    Chlorcyclizine-Pseudoephed (Stahist AD) 25-60 MG tablet     Sig: Take 1 tablet by mouth Every 8 (Eight) Hours As Needed (congestion. may substitute benadryl at night) for up to 5 days.     Dispense:  30 tablet     Refill:  0         Patient or patient representative verbalized consent for the use of Ambient Listening during the visit for chart documentation.  DARYL Hendrix 8/6/2025 09:39 EDT    FOR FULL DISCHARGE INSTRUCTIONS/COMMENTS/HANDOUTS please see the AVS

## 2025-08-02 NOTE — Clinical Note
[facility]  [address]  [place] [address]    Terri Aguilar was seen and treated in our emergency department on 8/2/2025.  She may return to work on 08/04/2025.         Thank you for choosing [facility].    Jose Cee MD

## 2025-08-04 LAB
QT INTERVAL: 384 MS
QTC INTERVAL: 406 MS

## (undated) DEVICE — INTENDED FOR TISSUE SEPARATION, AND OTHER PROCEDURES THAT REQUIRE A SHARP SURGICAL BLADE TO PUNCTURE OR CUT.: Brand: BARD-PARKER ® CARBON RIB-BACK BLADES

## (undated) DEVICE — PAD,SANITARY,11 IN,MAXI,N-STRL,IND WRAP: Brand: MEDLINE

## (undated) DEVICE — THE STERILE LIGHT HANDLE COVER IS USED WITH STERIS SURGICAL LIGHTING AND VISUALIZATION SYSTEMS.

## (undated) DEVICE — PACK,LITHOTOMY,PK I: Brand: MEDLINE

## (undated) DEVICE — SYR CONTRL LUERLOK 10CC

## (undated) DEVICE — SLV SCD KN/LEN ADJ EXPRSS BLENDED MD 1P/U

## (undated) DEVICE — GLV SURG BIOGEL LTX PF 6 1/2

## (undated) DEVICE — CATH URETH INTRMIT ALLPURP LTX 16F RED

## (undated) DEVICE — INTENDED FOR TISSUE SEPARATION, AND OTHER PROCEDURES THAT REQUIRE A SHARP SURGICAL BLADE TO PUNCTURE OR CUT.: Brand: BARD-PARKER ® STAINLESS STEEL BLADES

## (undated) DEVICE — GLV SURG SENSICARE ORTHO PF LF 7 STRL

## (undated) DEVICE — VAGINAL PREP TRAY: Brand: MEDLINE INDUSTRIES, INC.

## (undated) DEVICE — MINOR-LF: Brand: MEDLINE INDUSTRIES, INC.